# Patient Record
Sex: FEMALE | Race: WHITE | NOT HISPANIC OR LATINO | Employment: OTHER | URBAN - METROPOLITAN AREA
[De-identification: names, ages, dates, MRNs, and addresses within clinical notes are randomized per-mention and may not be internally consistent; named-entity substitution may affect disease eponyms.]

---

## 2017-02-03 ENCOUNTER — OFFICE VISIT (OUTPATIENT)
Dept: CARDIOLOGY | Facility: MEDICAL CENTER | Age: 82
End: 2017-02-03
Payer: MEDICARE

## 2017-02-03 VITALS
HEART RATE: 65 BPM | WEIGHT: 156 LBS | DIASTOLIC BLOOD PRESSURE: 72 MMHG | OXYGEN SATURATION: 94 % | SYSTOLIC BLOOD PRESSURE: 130 MMHG | BODY MASS INDEX: 23.11 KG/M2 | HEIGHT: 69 IN

## 2017-02-03 DIAGNOSIS — Z86.39 HISTORY OF HYPERCHOLESTEROLEMIA: Chronic | ICD-10-CM

## 2017-02-03 DIAGNOSIS — I34.0 MITRAL VALVE INSUFFICIENCY, UNSPECIFIED ETIOLOGY: ICD-10-CM

## 2017-02-03 DIAGNOSIS — R00.2 PALPITATIONS: Chronic | ICD-10-CM

## 2017-02-03 PROCEDURE — G8484 FLU IMMUNIZE NO ADMIN: HCPCS | Performed by: INTERNAL MEDICINE

## 2017-02-03 PROCEDURE — 1101F PT FALLS ASSESS-DOCD LE1/YR: CPT | Mod: 8P | Performed by: INTERNAL MEDICINE

## 2017-02-03 PROCEDURE — G8419 CALC BMI OUT NRM PARAM NOF/U: HCPCS | Performed by: INTERNAL MEDICINE

## 2017-02-03 PROCEDURE — 4040F PNEUMOC VAC/ADMIN/RCVD: CPT | Mod: 8P | Performed by: INTERNAL MEDICINE

## 2017-02-03 PROCEDURE — 1036F TOBACCO NON-USER: CPT | Performed by: INTERNAL MEDICINE

## 2017-02-03 PROCEDURE — 99214 OFFICE O/P EST MOD 30 MIN: CPT | Performed by: INTERNAL MEDICINE

## 2017-02-03 PROCEDURE — G8432 DEP SCR NOT DOC, RNG: HCPCS | Performed by: INTERNAL MEDICINE

## 2017-02-03 ASSESSMENT — ENCOUNTER SYMPTOMS
BLURRED VISION: 0
PND: 0
FEVER: 0
DEPRESSION: 0
CLAUDICATION: 0
LOSS OF CONSCIOUSNESS: 0
ABDOMINAL PAIN: 0
NERVOUS/ANXIOUS: 0
SPEECH CHANGE: 0
DIZZINESS: 0
ORTHOPNEA: 0
CHILLS: 0
PALPITATIONS: 0
MYALGIAS: 1
BRUISES/BLEEDS EASILY: 0
SHORTNESS OF BREATH: 0

## 2017-02-03 NOTE — Clinical Note
[facility]   [address]  [place] [address]  Phone: [phone]  Fax: [phone]              Rosemarie Guajardo  1935    Encounter Date: 2/3/2017    Viry Ewing M.D.          PROGRESS NOTE:  Subjective:   Rosemarie Guajardo is a pleasant 80-year-old female with known history of dyslipidemia, palpitations and PVD presenting today for follow-up evaluation of worsening palpitations.    For the past few weeks patient has noticed worsening intermittent episodes of palpitations more prominent in the evening lasting for a few minutes denied any specific triggering or relieving factors. No complaints of dizziness or lightheadedness associated with palpitations. No complaints of lower extremity edema or claudication. Due to myalgias patient discontinued pravastatin.    Past Medical History   Diagnosis Date   • History of hypercholesterolemia     • Palpitations     • WPW (Yoon-Parkinson-White syndrome) 1970s     Originally diagnosed in [place], [place].   • Mitral regurgitation October 2015     Echocardiogram with normal LV size, LVEF 60%. Enlarged RA. Mild MR, mild TR. RVSP 40mmHg.     Past Surgical History   Procedure Laterality Date   • Cervical disk and fusion anterior     • Cataract extraction with iol       Family History   Problem Relation Age of Onset   • Heart Disease Mother      atrial fibrillation   • Lung Disease Father      PNA   • Stroke Father      CVA     History   Smoking status   • Never Smoker    Smokeless tobacco   • Never Used     Allergies   Allergen Reactions   • Statins [Hmg-Coa-R Inhibitors]      Intolerance   • Codeine Nausea     BP drops     Outpatient Encounter Prescriptions as of 2/3/2017   Medication Sig Dispense Refill   • Multiple Vitamins-Minerals (WOMENS MULTI VITAMIN & MINERAL PO) Take  by mouth every day.     • coenzyme Q-10 100 MG Cap capsule Take 1 Cap by mouth every day. 30 Cap 11   • Cholecalciferol  "(VITAMIN D3) 2000 UNITS Tab Take  by mouth.     • Probiotic Product (PROBIOTIC DAILY PO) Take  by mouth.     • fluticasone (FLONASE) 50 MCG/ACT nasal spray Spray 2 Sprays in nose as needed.     • Fiber POWD Take  by mouth as needed.       No facility-administered encounter medications on file as of 2/3/2017.     Review of Systems   Constitutional: Negative for fever and chills.   HENT: Negative for congestion.    Eyes: Negative for blurred vision.   Respiratory: Negative for shortness of breath.    Cardiovascular: Negative for chest pain, palpitations, orthopnea, claudication, leg swelling and PND.   Gastrointestinal: Negative for abdominal pain.   Musculoskeletal: Positive for myalgias. Negative for joint pain.   Skin: Negative for rash.   Neurological: Negative for dizziness, speech change and loss of consciousness.   Endo/Heme/Allergies: Does not bruise/bleed easily.   Psychiatric/Behavioral: Negative for depression. The patient is not nervous/anxious.    All other systems reviewed and are negative.       Objective:   /72 mmHg  Pulse 65  Ht 1.765 m (5' 9.49\")  Wt 70.761 kg (156 lb)  BMI 22.71 kg/m2  SpO2 94%    Physical Exam   Constitutional: She is oriented to person, place, and time. She appears well-developed. No distress.   HENT:   Mouth/Throat: Mucous membranes are normal.   Eyes: Conjunctivae and EOM are normal.   Neck: No JVD present. No tracheal deviation present. No thyroid mass present.   Cardiovascular: Normal rate and regular rhythm.    Murmur heard.   Systolic murmur is present with a grade of 2/6   Pulses:       Radial pulses are 2+ on the right side, and 2+ on the left side.        Dorsalis pedis pulses are 2+ on the right side, and 2+ on the left side.   Systolic murmur best heard in the mitral area   Pulmonary/Chest: Effort normal and breath sounds normal. No respiratory distress. She exhibits no tenderness.   Abdominal: Soft. There is no tenderness.   Musculoskeletal: Normal range of " motion. She exhibits no edema.   Neurological: She is alert and oriented to person, place, and time. She has normal strength. She displays no tremor.   Skin: Skin is warm and dry. She is not diaphoretic.   Psychiatric: She has a normal mood and affect. Her behavior is normal.   Vitals reviewed.  Stress echo: 12/22/15  Fair exercise tolerance, achieving 10.1 METS and 99 % of max predicted heart rate.   Negative stress echo for ischemia with adequate stress achieved by   heart rate criteria.   False positive stress EKG. Frequent PAC's    Abdominal aortic Doppler: 11/30/15  No evidence of abdominal aortic aneurysm.   Velocities are elevated in the left external iliac artery at the mid level   consistent with greater than 50% stenosis    Results for ALEIDA DEL ROSARIO (MRN [id number]) as of 2015 10:10   2015    Sodium 138   Potassium 3.9   Chloride 104   Co2 28   Anion Gap 6.0   Glucose 105 (H)   Bun 14   Creatinine 0.85   GFR If Non African American >60   Calcium 9.6   AST(SGOT) 23   ALT(SGPT) 18   Alkaline Phosphatase 63   Cholesterol,Tot 243 (H)   Triglycerides 162 (H)   HDL 56    (H)     TTE: 10/13/15  No prior study is available for comparison.   Left ventricular ejection fraction is visually estimated to be 60%. Normal regional wall motion. Grade I diastolic dysfunction.  Mild mitral regurgitation.  Mild tricuspid regurgitation. Right ventricular systolic pressure is estimated to be 40 mmHg    EK/29/15  Sinus bradycardia 57 bpm normal axis T wave inversions V3 to V6  No evidence of WPW on EKG  Assessment:     1. Dyslipidemia  2. Mitral regurgitation  3. Palpitations  4. PVD    Medical Decision Making:  Today's Assessment / Status / Plan:     1. Statins were discontinued due to myalgias.  Continue dietary modification and exercise.  2. Repeat echo in 5 years.  3. Symptomatic PVCs versus SVT   48 Holter monitor for further evaluation.  4. Complaints of claudication will continue to monitor for  now    Follow-up in 4 weeks.  Holter monitor prior to next visit.    Thank you for allowing me to participate in taking care of patient.    Viry Ewing. MD.        Jayden Newman M.D.  [address]  [place] [address]  VIA Facsimile: [phone]

## 2017-02-03 NOTE — PROGRESS NOTES
Subjective:   Rosemarie Guajardo is a pleasant 80-year-old female with known history of dyslipidemia, palpitations and PVD presenting today for follow-up evaluation of worsening palpitations.    For the past few weeks patient has noticed worsening intermittent episodes of palpitations more prominent in the evening lasting for a few minutes denied any specific triggering or relieving factors. No complaints of dizziness or lightheadedness associated with palpitations. No complaints of lower extremity edema or claudication. Due to myalgias patient discontinued pravastatin.    Past Medical History   Diagnosis Date   • History of hypercholesterolemia     • Palpitations     • WPW (Yoon-Parkinson-White syndrome) 1970s     Originally diagnosed in [place], [place].   • Mitral regurgitation October 2015     Echocardiogram with normal LV size, LVEF 60%. Enlarged RA. Mild MR, mild TR. RVSP 40mmHg.     Past Surgical History   Procedure Laterality Date   • Cervical disk and fusion anterior     • Cataract extraction with iol       Family History   Problem Relation Age of Onset   • Heart Disease Mother      atrial fibrillation   • Lung Disease Father      PNA   • Stroke Father      CVA     History   Smoking status   • Never Smoker    Smokeless tobacco   • Never Used     Allergies   Allergen Reactions   • Statins [Hmg-Coa-R Inhibitors]      Intolerance   • Codeine Nausea     BP drops     Outpatient Encounter Prescriptions as of 2/3/2017   Medication Sig Dispense Refill   • Multiple Vitamins-Minerals (WOMENS MULTI VITAMIN & MINERAL PO) Take  by mouth every day.     • coenzyme Q-10 100 MG Cap capsule Take 1 Cap by mouth every day. 30 Cap 11   • Cholecalciferol (VITAMIN D3) 2000 UNITS Tab Take  by mouth.     • Probiotic Product (PROBIOTIC DAILY PO) Take  by mouth.     • fluticasone (FLONASE) 50 MCG/ACT nasal spray Spray 2 Sprays in nose as needed.     • Fiber POWD Take  by mouth as needed.       No facility-administered encounter  "medications on file as of 2/3/2017.     Review of Systems   Constitutional: Negative for fever and chills.   HENT: Negative for congestion.    Eyes: Negative for blurred vision.   Respiratory: Negative for shortness of breath.    Cardiovascular: Negative for chest pain, palpitations, orthopnea, claudication, leg swelling and PND.   Gastrointestinal: Negative for abdominal pain.   Musculoskeletal: Positive for myalgias. Negative for joint pain.   Skin: Negative for rash.   Neurological: Negative for dizziness, speech change and loss of consciousness.   Endo/Heme/Allergies: Does not bruise/bleed easily.   Psychiatric/Behavioral: Negative for depression. The patient is not nervous/anxious.    All other systems reviewed and are negative.       Objective:   /72 mmHg  Pulse 65  Ht 1.765 m (5' 9.49\")  Wt 70.761 kg (156 lb)  BMI 22.71 kg/m2  SpO2 94%    Physical Exam   Constitutional: She is oriented to person, place, and time. She appears well-developed. No distress.   HENT:   Mouth/Throat: Mucous membranes are normal.   Eyes: Conjunctivae and EOM are normal.   Neck: No JVD present. No tracheal deviation present. No thyroid mass present.   Cardiovascular: Normal rate and regular rhythm.    Murmur heard.   Systolic murmur is present with a grade of 2/6   Pulses:       Radial pulses are 2+ on the right side, and 2+ on the left side.        Dorsalis pedis pulses are 2+ on the right side, and 2+ on the left side.   Systolic murmur best heard in the mitral area   Pulmonary/Chest: Effort normal and breath sounds normal. No respiratory distress. She exhibits no tenderness.   Abdominal: Soft. There is no tenderness.   Musculoskeletal: Normal range of motion. She exhibits no edema.   Neurological: She is alert and oriented to person, place, and time. She has normal strength. She displays no tremor.   Skin: Skin is warm and dry. She is not diaphoretic.   Psychiatric: She has a normal mood and affect. Her behavior is " normal.   Vitals reviewed.  Stress echo: 12/22/15  Fair exercise tolerance, achieving 10.1 METS and 99 % of max predicted heart rate.   Negative stress echo for ischemia with adequate stress achieved by   heart rate criteria.   False positive stress EKG. Frequent PAC's    Abdominal aortic Doppler: 11/30/15  No evidence of abdominal aortic aneurysm.   Velocities are elevated in the left external iliac artery at the mid level   consistent with greater than 50% stenosis    Results for ALEIDA DEL ROSARIO (MRN [id number]) as of 2015 10:10   2015    Sodium 138   Potassium 3.9   Chloride 104   Co2 28   Anion Gap 6.0   Glucose 105 (H)   Bun 14   Creatinine 0.85   GFR If Non African American >60   Calcium 9.6   AST(SGOT) 23   ALT(SGPT) 18   Alkaline Phosphatase 63   Cholesterol,Tot 243 (H)   Triglycerides 162 (H)   HDL 56    (H)     TTE: 10/13/15  No prior study is available for comparison.   Left ventricular ejection fraction is visually estimated to be 60%. Normal regional wall motion. Grade I diastolic dysfunction.  Mild mitral regurgitation.  Mild tricuspid regurgitation. Right ventricular systolic pressure is estimated to be 40 mmHg    EK/29/15  Sinus bradycardia 57 bpm normal axis T wave inversions V3 to V6  No evidence of WPW on EKG  Assessment:     1. Dyslipidemia  2. Mitral regurgitation  3. Palpitations  4. PVD    Medical Decision Making:  Today's Assessment / Status / Plan:     1. Statins were discontinued due to myalgias.  Continue dietary modification and exercise.  2. Repeat echo in 5 years.  3. Symptomatic PVCs versus SVT   48 Holter monitor for further evaluation.  4. Complaints of claudication will continue to monitor for now    Follow-up in 4 weeks.  Holter monitor prior to next visit.    Thank you for allowing me to participate in taking care of patient.    Viry Tamayo MD.

## 2017-02-03 NOTE — MR AVS SNAPSHOT
"        Rosemarie Guajardo   2/3/2017 11:30 AM   Office Visit   MRN: [id number]    Department:  Heart [facility]   Dept Phone:  [phone]    Description:  Female : 1935   Provider:  Viry Ewing M.D.           Reason for Visit     Follow-Up           Allergies as of 2/3/2017     Allergen Noted Reactions    Statins [Hmg-Coa-R Inhibitors] 2009       Intolerance    Codeine 2015   Nausea    BP drops      You were diagnosed with     Palpitations   [785.1.ICD-9-CM]       Mitral valve insufficiency, unspecified etiology   [0184323]       History of hypercholesterolemia   [517366]         Vital Signs     Blood Pressure Pulse Height Weight Body Mass Index Oxygen Saturation    130/72 mmHg 65 1.765 m (5' 9.49\") 70.761 kg (156 lb) 22.71 kg/m2 94%    Smoking Status                   Never Smoker            Basic Information     Date Of Birth Sex Race Ethnicity Preferred Language    1935 Female White Non- English      Your appointments     Mar 07, 2017 11:15 AM   FOLLOW UP with Viry Ewing M.D.   [facility]    [address]  [facility] [address] [phone]              Problem List              ICD-10-CM Priority Class Noted - Resolved    History of hypercholesterolemia (Chronic) Z86.39 High  2012 - Present    Palpitations (Chronic) R00.2 High  2012 - Present    WPW (Yoon-Parkinson-White syndrome) (Chronic) I45.6 Medium  2012 - Present    Mitral regurgitation I34.0 Medium  1/15/2016 - Present      Health Maintenance        Date Due Completion Dates    IMM DTaP/Tdap/Td Vaccine (1 - Tdap) 1954 ---    PAP SMEAR 1956 ---    MAMMOGRAM 1975 ---    COLONOSCOPY 1985 ---    IMM ZOSTER VACCINE 1995 ---    BONE DENSITY 2000 ---    IMM PNEUMOCOCCAL 65+ (ADULT) LOW/MEDIUM RISK SERIES (1 of 2 - PCV13) 2000 ---    IMM INFLUENZA (1) 2016 ---            Current Immunizations     No " immunizations on file.      Below and/or attached are the medications your provider expects you to take. Review all of your home medications and newly ordered medications with your provider and/or pharmacist. Follow medication instructions as directed by your provider and/or pharmacist. Please keep your medication list with you and share with your provider. Update the information when medications are discontinued, doses are changed, or new medications (including over-the-counter products) are added; and carry medication information at all times in the event of emergency situations     Allergies:  STATINS - (reactions not documented)     CODEINE - Nausea               Medications  Valid as of: February 03, 2017 - 11:38 AM    Generic Name Brand Name Tablet Size Instructions for use    Cholecalciferol (Tab) Vitamin D3 2000 UNITS Take  by mouth.        Coenzyme Q10 (Cap) coenzyme Q-10 100 MG Take 1 Cap by mouth every day.        Fiber (Powder) Fiber  Take  by mouth as needed.        Fluticasone Propionate (Suspension) FLONASE 50 MCG/ACT Spray 2 Sprays in nose as needed.        Multiple Vitamins-Minerals   Take  by mouth every day.        Probiotic Product   Take  by mouth.        .                 Medicines prescribed today were sent to:     [id number] - [place], [place] - [address]    [address] [facility] [address]    Phone: [phone] Fax: [phone]    Open 24 Hours?: No      Medication refill instructions:       If your prescription bottle indicates you have medication refills left, it is not necessary to call your provider’s office. Please contact your pharmacy and they will refill your medication.    If your prescription bottle indicates you do not have any refills left, you may request refills at any time through one of the following ways: The online [employer] system (except Urgent Care), by calling your provider’s office, or by asking your pharmacy to contact your provider’s office with a refill request.  Medication refills are processed only during regular business hours and may not be available until the next business day. Your provider may request additional information or to have a follow-up visit with you prior to refilling your medication.   *Please Note: Medication refills are assigned a new Rx number when refilled electronically. Your pharmacy may indicate that no refills were authorized even though a new prescription for the same medication is available at the pharmacy. Please request the medicine by name with the pharmacy before contacting your provider for a refill.        Your To Do List     Future Labs/Procedures Complete By Expires    HOLTER MONITOR STUDY  As directed 2/3/2018         [employer] Access Code: Activation code not generated  Current [employer] Status: Active

## 2017-02-21 ENCOUNTER — NON-PROVIDER VISIT (OUTPATIENT)
Dept: CARDIOLOGY | Facility: MEDICAL CENTER | Age: 82
End: 2017-02-21
Payer: MEDICARE

## 2017-02-21 DIAGNOSIS — I49.3 PVC (PREMATURE VENTRICULAR CONTRACTION): ICD-10-CM

## 2017-02-21 DIAGNOSIS — R00.2 PALPITATIONS: Chronic | ICD-10-CM

## 2017-02-21 DIAGNOSIS — I49.1 PREMATURE ATRIAL CONTRACTION: ICD-10-CM

## 2017-02-24 DIAGNOSIS — R00.2 PALPITATIONS: Chronic | ICD-10-CM

## 2017-02-27 ENCOUNTER — TELEPHONE (OUTPATIENT)
Dept: CARDIOLOGY | Facility: MEDICAL CENTER | Age: 82
End: 2017-02-27

## 2017-02-27 NOTE — TELEPHONE ENCOUNTER
----- Message from Jenny Avitia sent at 2/27/2017  9:05 AM PST -----  Regarding: Patient calling about monitor results  AM/[place]    Patient wants a call back to get the results of her heart monitor and can be reached at [phone].

## 2017-02-27 NOTE — TELEPHONE ENCOUNTER
Pt. notified of preliminary report (not read by Dr. Ewing yet) Shows some PVC's & PAC's. No AF or pauses. Will review at appt 3/7.

## 2017-03-01 LAB — EKG IMPRESSION: NORMAL

## 2017-03-01 PROCEDURE — 93224 XTRNL ECG REC UP TO 48 HRS: CPT | Performed by: INTERNAL MEDICINE

## 2017-03-07 ENCOUNTER — OFFICE VISIT (OUTPATIENT)
Dept: CARDIOLOGY | Facility: MEDICAL CENTER | Age: 82
End: 2017-03-07
Payer: MEDICARE

## 2017-03-07 VITALS
BODY MASS INDEX: 23.4 KG/M2 | OXYGEN SATURATION: 95 % | HEART RATE: 56 BPM | SYSTOLIC BLOOD PRESSURE: 116 MMHG | HEIGHT: 69 IN | DIASTOLIC BLOOD PRESSURE: 66 MMHG | WEIGHT: 158 LBS

## 2017-03-07 DIAGNOSIS — R00.2 PALPITATIONS: Chronic | ICD-10-CM

## 2017-03-07 DIAGNOSIS — Z86.39 HISTORY OF HYPERCHOLESTEROLEMIA: Chronic | ICD-10-CM

## 2017-03-07 PROCEDURE — 4040F PNEUMOC VAC/ADMIN/RCVD: CPT | Mod: 8P | Performed by: INTERNAL MEDICINE

## 2017-03-07 PROCEDURE — 1101F PT FALLS ASSESS-DOCD LE1/YR: CPT | Mod: 8P | Performed by: INTERNAL MEDICINE

## 2017-03-07 PROCEDURE — G8432 DEP SCR NOT DOC, RNG: HCPCS | Performed by: INTERNAL MEDICINE

## 2017-03-07 PROCEDURE — G8484 FLU IMMUNIZE NO ADMIN: HCPCS | Performed by: INTERNAL MEDICINE

## 2017-03-07 PROCEDURE — G8420 CALC BMI NORM PARAMETERS: HCPCS | Performed by: INTERNAL MEDICINE

## 2017-03-07 PROCEDURE — 99214 OFFICE O/P EST MOD 30 MIN: CPT | Performed by: INTERNAL MEDICINE

## 2017-03-07 PROCEDURE — 1036F TOBACCO NON-USER: CPT | Performed by: INTERNAL MEDICINE

## 2017-03-07 ASSESSMENT — ENCOUNTER SYMPTOMS
DIZZINESS: 0
DEPRESSION: 0
ABDOMINAL PAIN: 0
SHORTNESS OF BREATH: 0
BRUISES/BLEEDS EASILY: 0
FEVER: 0
SPEECH CHANGE: 0
LOSS OF CONSCIOUSNESS: 0
MYALGIAS: 1
CLAUDICATION: 0
NERVOUS/ANXIOUS: 0
PALPITATIONS: 1
PND: 0
ORTHOPNEA: 0
CHILLS: 0
BLURRED VISION: 0

## 2017-03-07 NOTE — MR AVS SNAPSHOT
"Rosemarie Guajardo   3/7/2017 11:15 AM   Office Visit   MRN: [id number]    Department:  Heart [facility]   Dept Phone:  [phone]    Description:  Female : 1935   Provider:  Viry Ewing M.D.           Reason for Visit     Follow-Up           Allergies as of 3/7/2017     Allergen Noted Reactions    Statins [Hmg-Coa-R Inhibitors] 2009       Intolerance    Codeine 2015   Nausea    BP drops      You were diagnosed with     Palpitations   [785.1.ICD-9-CM]       History of hypercholesterolemia   [462510]         Vital Signs     Blood Pressure Pulse Height Weight Body Mass Index Oxygen Saturation    116/66 mmHg 56 1.753 m (5' 9\") 71.668 kg (158 lb) 23.32 kg/m2 95%    Smoking Status                   Never Smoker            Basic Information     Date Of Birth Sex Race Ethnicity Preferred Language    1935 Female White Non- English      Problem List              ICD-10-CM Priority Class Noted - Resolved    History of hypercholesterolemia (Chronic) Z86.39 High  2012 - Present    Palpitations (Chronic) R00.2 High  2012 - Present    WPW (Yoon-Parkinson-White syndrome) (Chronic) I45.6 Medium  2012 - Present    Mitral regurgitation I34.0 Medium  1/15/2016 - Present      Health Maintenance        Date Due Completion Dates    IMM DTaP/Tdap/Td Vaccine (1 - Tdap) 1954 ---    PAP SMEAR 1956 ---    MAMMOGRAM 1975 ---    COLONOSCOPY 1985 ---    IMM ZOSTER VACCINE 1995 ---    BONE DENSITY 2000 ---    IMM PNEUMOCOCCAL 65+ (ADULT) LOW/MEDIUM RISK SERIES (1 of 2 - PCV13) 2000 ---    IMM INFLUENZA (1) 2016 ---            Current Immunizations     No immunizations on file.      Below and/or attached are the medications your provider expects you to take. Review all of your home medications and newly ordered medications with your provider and/or pharmacist. Follow medication instructions as directed by your provider and/or pharmacist. Please keep your " medication list with you and share with your provider. Update the information when medications are discontinued, doses are changed, or new medications (including over-the-counter products) are added; and carry medication information at all times in the event of emergency situations     Allergies:  STATINS - (reactions not documented)     CODEINE - Nausea               Medications  Valid as of: March 07, 2017 - 11:17 AM    Generic Name Brand Name Tablet Size Instructions for use    Cholecalciferol (Tab) Vitamin D3 2000 UNITS Take  by mouth.        Coenzyme Q10 (Cap) coenzyme Q-10 100 MG Take 1 Cap by mouth every day.        Fiber (Powder) Fiber  Take  by mouth as needed.        Fluticasone Propionate (Suspension) FLONASE 50 MCG/ACT Spray 2 Sprays in nose as needed.        Lutein   Take  by mouth.        Multiple Vitamins-Minerals   Take  by mouth every day.        Pravastatin Sodium   Take  by mouth.        Probiotic Product   Take  by mouth.        .                 Medicines prescribed today were sent to:     [id number] - [place], [place] - [address]    [address] [place] [address]    Phone: [phone] Fax: [phone]    Open 24 Hours?: No      Medication refill instructions:       If your prescription bottle indicates you have medication refills left, it is not necessary to call your provider’s office. Please contact your pharmacy and they will refill your medication.    If your prescription bottle indicates you do not have any refills left, you may request refills at any time through one of the following ways: The online "[employer] system (except Urgent Care), by calling your provider’s office, or by asking your pharmacy to contact your provider’s office with a refill request. Medication refills are processed only during regular business hours and may not be available until the next business day. Your provider may request additional information or to have a follow-up visit with you prior to refilling your  medication.   *Please Note: Medication refills are assigned a new Rx number when refilled electronically. Your pharmacy may indicate that no refills were authorized even though a new prescription for the same medication is available at the pharmacy. Please request the medicine by name with the pharmacy before contacting your provider for a refill.        Your To Do List     Future Labs/Procedures Complete By Expires    COMP METABOLIC PANEL  As directed 3/7/2018    COMP METABOLIC PANEL  As directed 3/7/2018    LIPID PROFILE  As directed 3/7/2018    LIPID PROFILE  As directed 3/7/2018         MyChart Access Code: Activation code not generated  Current [employer] Status: Active

## 2017-03-07 NOTE — Clinical Note
[facility]   [address]  [place] [address]  Phone: [phone]  Fax: [phone]              Rosemarie Guajardo  1935    Encounter Date: 3/7/2017    Viry Ewing M.D.          PROGRESS NOTE:  Subjective:   Rosemarie Guajardo is a pleasant 80-year-old female with known history of dyslipidemia, palpitations and PVD presenting today for follow-up evaluation of palpitations.    Patient recently has increased caffeine intake since then patient has noticed worsening episodes of palpitations. Since she cut down caffeine intake palpitations have gone down. Denied any complaints of exertional chest pain, pressure or tightness. No complaints of dizziness, lightheadedness or LOC. No complaints of lower extremity edema or claudication.    Past Medical History   Diagnosis Date   • History of hypercholesterolemia     • Palpitations     • WPW (Yoon-Parkinson-White syndrome) 1970s     Originally diagnosed in [place], [place].   • Mitral regurgitation October 2015     Echocardiogram with normal LV size, LVEF 60%. Enlarged RA. Mild MR, mild TR. RVSP 40mmHg.     Past Surgical History   Procedure Laterality Date   • Cervical disk and fusion anterior     • Cataract extraction with iol       Family History   Problem Relation Age of Onset   • Heart Disease Mother      atrial fibrillation   • Lung Disease Father      PNA   • Stroke Father      CVA     History   Smoking status   • Never Smoker    Smokeless tobacco   • Never Used     Allergies   Allergen Reactions   • Statins [Hmg-Coa-R Inhibitors]      Intolerance   • Codeine Nausea     BP drops     Outpatient Encounter Prescriptions as of 3/7/2017   Medication Sig Dispense Refill   • PRAVASTATIN SODIUM PO Take  by mouth.     • LUTEIN PO Take  by mouth.     • fluticasone (FLONASE) 50 MCG/ACT nasal spray Spray 2 Sprays in nose as needed.     • Multiple Vitamins-Minerals (WOMENS MULTI VITAMIN & MINERAL PO)  "Take  by mouth every day.     • coenzyme Q-10 100 MG Cap capsule Take 1 Cap by mouth every day. 30 Cap 11   • Cholecalciferol (VITAMIN D3) 2000 UNITS Tab Take  by mouth.     • Probiotic Product (PROBIOTIC DAILY PO) Take  by mouth.     • Fiber POWD Take  by mouth as needed.       No facility-administered encounter medications on file as of 3/7/2017.     Review of Systems   Constitutional: Negative for fever and chills.   HENT: Negative for congestion.    Eyes: Negative for blurred vision.   Respiratory: Negative for shortness of breath.    Cardiovascular: Positive for palpitations. Negative for chest pain, orthopnea, claudication, leg swelling and PND.   Gastrointestinal: Negative for abdominal pain.   Musculoskeletal: Positive for myalgias. Negative for joint pain.   Skin: Negative for rash.   Neurological: Negative for dizziness, speech change and loss of consciousness.   Endo/Heme/Allergies: Does not bruise/bleed easily.   Psychiatric/Behavioral: Negative for depression. The patient is not nervous/anxious.    All other systems reviewed and are negative.       Objective:   /66 mmHg  Pulse 56  Ht 1.753 m (5' 9\")  Wt 71.668 kg (158 lb)  BMI 23.32 kg/m2  SpO2 95%    Physical Exam   Constitutional: She is oriented to person, place, and time. She appears well-developed. No distress.   HENT:   Mouth/Throat: Mucous membranes are normal.   Eyes: Conjunctivae and EOM are normal.   Neck: No JVD present. No tracheal deviation present. No thyroid mass present.   Cardiovascular: Normal rate and regular rhythm.    Murmur heard.   Systolic murmur is present with a grade of 2/6   Pulses:       Radial pulses are 2+ on the right side, and 2+ on the left side.        Dorsalis pedis pulses are 2+ on the right side, and 2+ on the left side.   Systolic murmur best heard in the mitral area   Pulmonary/Chest: Effort normal and breath sounds normal. No respiratory distress. She exhibits no tenderness.   Abdominal: Soft. There is " no tenderness.   Musculoskeletal: Normal range of motion. She exhibits no edema.   Neurological: She is alert and oriented to person, place, and time. She has normal strength. She displays no tremor.   Skin: Skin is warm and dry. She is not diaphoretic.   Psychiatric: She has a normal mood and affect. Her behavior is normal.   Vitals reviewed.   48 hour Holter monitor: 2/21/17  Tracings were personally reviewed by me along with patient.  Underlying rhythm was sinus with minimum heart rate of 48 bpm and have a target of 58 BPM.  Most of the times when patient was symptomatic underlying rhythm was sinus one episode patient rhythm was sinus with PVC.  2 atrial run seen. Longest run was 6 beats at a heart rate of 125 BPM asymptomatic.  Results for ALEIDA DEL ROSARIO (MRN [id number]) as of 3/7/2017 12:26   4/19/2016 7/22/2016    Sodium 136 143   Potassium 4.1 4.3   Chloride 96 (L) 103   Co2 22 24   Glucose 92 100 (H)   Bun 15 14   Creatinine 0.81 0.76   GFR If Non  69 74   Bun-Creatinine Ratio 19 18   Calcium 9.5 9.2   AST(SGOT) 22 36   ALT(SGPT) 18 19   Alkaline Phosphatase 62 62   Cholesterol,Tot 215 (H) 168   Triglycerides 186 (H) 103   HDL 59 61    (H) 86   VLDL Cholesterol Calc 37 21     Stress echo: 12/22/15  Fair exercise tolerance, achieving 10.1 METS and 99 % of max predicted heart rate.   Negative stress echo for ischemia with adequate stress achieved by   heart rate criteria.   False positive stress EKG. Frequent PAC's    Abdominal aortic Doppler: 11/30/15  No evidence of abdominal aortic aneurysm.   Velocities are elevated in the left external iliac artery at the mid level   consistent with greater than 50% stenosis    TTE: 10/13/15  No prior study is available for comparison.   Left ventricular ejection fraction is visually estimated to be 60%. Normal regional wall motion. Grade I diastolic dysfunction.  Mild mitral regurgitation.  Mild tricuspid regurgitation. Right ventricular  systolic pressure is estimated to be 40 mmHg    EK/29/15  Sinus bradycardia 57 bpm normal axis T wave inversions V3 to V6  No evidence of WPW on EKG  Assessment:     1. Dyslipidemia  2. Mitral regurgitation  3. Palpitations  4. PVD  Medical Decision Making:  Today's Assessment / Status / Plan:     1. Patient denied any myalgias while on pravastatin.  LDL less than 100.  Continue pravastatin.  2. Repeat echo in 4 years.  3. Symptomatic PVCs reassurance for now.  Will hold off on initiating beta blocker secondary to bradycardia. If patient still continues to have symptoms will consider initiating digoxin.  4. Asymptomatic will continue to monitor  Patient is on statins.    Follow-up in 1 year.  Labs in July and then in one year prior to next visit.    Thank you for allowing me to participate in taking care of patient.    Viry Tamayo MD.        Jayden Newman M.D.  [address]  [address]  [facility] [address]  VIA Facsimile: [phone]

## 2017-03-07 NOTE — PROGRESS NOTES
Subjective:   Rosemarie Guajardo is a pleasant 80-year-old female with known history of dyslipidemia, palpitations and PVD presenting today for follow-up evaluation of palpitations.    Patient recently has increased caffeine intake since then patient has noticed worsening episodes of palpitations. Since she cut down caffeine intake palpitations have gone down. Denied any complaints of exertional chest pain, pressure or tightness. No complaints of dizziness, lightheadedness or LOC. No complaints of lower extremity edema or claudication.    Past Medical History   Diagnosis Date   • History of hypercholesterolemia     • Palpitations     • WPW (Yoon-Parkinson-White syndrome) 1970s     Originally diagnosed in [place], [place].   • Mitral regurgitation October 2015     Echocardiogram with normal LV size, LVEF 60%. Enlarged RA. Mild MR, mild TR. RVSP 40mmHg.     Past Surgical History   Procedure Laterality Date   • Cervical disk and fusion anterior     • Cataract extraction with iol       Family History   Problem Relation Age of Onset   • Heart Disease Mother      atrial fibrillation   • Lung Disease Father      PNA   • Stroke Father      CVA     History   Smoking status   • Never Smoker    Smokeless tobacco   • Never Used     Allergies   Allergen Reactions   • Statins [Hmg-Coa-R Inhibitors]      Intolerance   • Codeine Nausea     BP drops     Outpatient Encounter Prescriptions as of 3/7/2017   Medication Sig Dispense Refill   • PRAVASTATIN SODIUM PO Take  by mouth.     • LUTEIN PO Take  by mouth.     • fluticasone (FLONASE) 50 MCG/ACT nasal spray Spray 2 Sprays in nose as needed.     • Multiple Vitamins-Minerals (WOMENS MULTI VITAMIN & MINERAL PO) Take  by mouth every day.     • coenzyme Q-10 100 MG Cap capsule Take 1 Cap by mouth every day. 30 Cap 11   • Cholecalciferol (VITAMIN D3) 2000 UNITS Tab Take  by mouth.     • Probiotic Product (PROBIOTIC DAILY PO) Take  by mouth.     • Fiber POWD Take  by mouth as needed.       No  "facility-administered encounter medications on file as of 3/7/2017.     Review of Systems   Constitutional: Negative for fever and chills.   HENT: Negative for congestion.    Eyes: Negative for blurred vision.   Respiratory: Negative for shortness of breath.    Cardiovascular: Positive for palpitations. Negative for chest pain, orthopnea, claudication, leg swelling and PND.   Gastrointestinal: Negative for abdominal pain.   Musculoskeletal: Positive for myalgias. Negative for joint pain.   Skin: Negative for rash.   Neurological: Negative for dizziness, speech change and loss of consciousness.   Endo/Heme/Allergies: Does not bruise/bleed easily.   Psychiatric/Behavioral: Negative for depression. The patient is not nervous/anxious.    All other systems reviewed and are negative.       Objective:   /66 mmHg  Pulse 56  Ht 1.753 m (5' 9\")  Wt 71.668 kg (158 lb)  BMI 23.32 kg/m2  SpO2 95%    Physical Exam   Constitutional: She is oriented to person, place, and time. She appears well-developed. No distress.   HENT:   Mouth/Throat: Mucous membranes are normal.   Eyes: Conjunctivae and EOM are normal.   Neck: No JVD present. No tracheal deviation present. No thyroid mass present.   Cardiovascular: Normal rate and regular rhythm.    Murmur heard.   Systolic murmur is present with a grade of 2/6   Pulses:       Radial pulses are 2+ on the right side, and 2+ on the left side.        Dorsalis pedis pulses are 2+ on the right side, and 2+ on the left side.   Systolic murmur best heard in the mitral area   Pulmonary/Chest: Effort normal and breath sounds normal. No respiratory distress. She exhibits no tenderness.   Abdominal: Soft. There is no tenderness.   Musculoskeletal: Normal range of motion. She exhibits no edema.   Neurological: She is alert and oriented to person, place, and time. She has normal strength. She displays no tremor.   Skin: Skin is warm and dry. She is not diaphoretic.   Psychiatric: She has a " normal mood and affect. Her behavior is normal.   Vitals reviewed.   48 hour Holter monitor: 17  Tracings were personally reviewed by me along with patient.  Underlying rhythm was sinus with minimum heart rate of 48 bpm and have a target of 58 BPM.  Most of the times when patient was symptomatic underlying rhythm was sinus one episode patient rhythm was sinus with PVC.  2 atrial run seen. Longest run was 6 beats at a heart rate of 125 BPM asymptomatic.  Results for ALEIDA DEL ROSARIO (MRN [id number]) as of 3/7/2017 12:26   2016    Sodium 136 143   Potassium 4.1 4.3   Chloride 96 (L) 103   Co2 22 24   Glucose 92 100 (H)   Bun 15 14   Creatinine 0.81 0.76   GFR If Non  69 74   Bun-Creatinine Ratio 19 18   Calcium 9.5 9.2   AST(SGOT) 22 36   ALT(SGPT) 18 19   Alkaline Phosphatase 62 62   Cholesterol,Tot 215 (H) 168   Triglycerides 186 (H) 103   HDL 59 61    (H) 86   VLDL Cholesterol Calc 37 21     Stress echo: 12/22/15  Fair exercise tolerance, achieving 10.1 METS and 99 % of max predicted heart rate.   Negative stress echo for ischemia with adequate stress achieved by   heart rate criteria.   False positive stress EKG. Frequent PAC's    Abdominal aortic Doppler: 11/30/15  No evidence of abdominal aortic aneurysm.   Velocities are elevated in the left external iliac artery at the mid level   consistent with greater than 50% stenosis    TTE: 10/13/15  No prior study is available for comparison.   Left ventricular ejection fraction is visually estimated to be 60%. Normal regional wall motion. Grade I diastolic dysfunction.  Mild mitral regurgitation.  Mild tricuspid regurgitation. Right ventricular systolic pressure is estimated to be 40 mmHg    EK/29/15  Sinus bradycardia 57 bpm normal axis T wave inversions V3 to V6  No evidence of WPW on EKG  Assessment:     1. Dyslipidemia  2. Mitral regurgitation  3. Palpitations  4. PVD  Medical Decision Making:  Today's Assessment /  Status / Plan:     1. Patient denied any myalgias while on pravastatin.  LDL less than 100.  Continue pravastatin.  2. Repeat echo in 4 years.  3. Symptomatic PVCs reassurance for now.  Will hold off on initiating beta blocker secondary to bradycardia. If patient still continues to have symptoms will consider initiating digoxin.  4. Asymptomatic will continue to monitor  Patient is on statins.    Follow-up in 1 year.  Labs in July and then in one year prior to next visit.    Thank you for allowing me to participate in taking care of patient.    Viry Tamayo MD.

## 2017-04-25 ENCOUNTER — RX ONLY (OUTPATIENT)
Age: 82
Setting detail: RX ONLY
End: 2017-04-25

## 2017-09-27 LAB
ALBUMIN SERPL-MCNC: 4.6 G/DL (ref 3.5–4.7)
ALBUMIN/GLOB SERPL: 1.6 {RATIO} (ref 1.2–2.2)
ALP SERPL-CCNC: 62 IU/L (ref 39–117)
ALT SERPL-CCNC: 17 IU/L (ref 0–32)
AST SERPL-CCNC: 22 IU/L (ref 0–40)
BILIRUB SERPL-MCNC: 0.6 MG/DL (ref 0–1.2)
BUN SERPL-MCNC: 10 MG/DL (ref 8–27)
BUN/CREAT SERPL: 13 (ref 12–28)
CALCIUM SERPL-MCNC: 9.4 MG/DL (ref 8.7–10.3)
CHLORIDE SERPL-SCNC: 99 MMOL/L (ref 96–106)
CHOLEST SERPL-MCNC: 231 MG/DL (ref 100–199)
CO2 SERPL-SCNC: 23 MMOL/L (ref 18–29)
COMMENT 011824: ABNORMAL
CREAT SERPL-MCNC: 0.8 MG/DL (ref 0.57–1)
GLOBULIN SER CALC-MCNC: 2.8 G/DL (ref 1.5–4.5)
GLUCOSE SERPL-MCNC: 104 MG/DL (ref 65–99)
HDLC SERPL-MCNC: 67 MG/DL
LDLC SERPL CALC-MCNC: 131 MG/DL (ref 0–99)
POTASSIUM SERPL-SCNC: 4 MMOL/L (ref 3.5–5.2)
PROT SERPL-MCNC: 7.4 G/DL (ref 6–8.5)
SODIUM SERPL-SCNC: 141 MMOL/L (ref 134–144)
TRIGL SERPL-MCNC: 165 MG/DL (ref 0–149)
VLDLC SERPL CALC-MCNC: 33 MG/DL (ref 5–40)

## 2017-10-31 ENCOUNTER — APPOINTMENT (RX ONLY)
Dept: URBAN - METROPOLITAN AREA CLINIC 4 | Facility: CLINIC | Age: 82
Setting detail: DERMATOLOGY
End: 2017-10-31

## 2017-10-31 DIAGNOSIS — L82.1 OTHER SEBORRHEIC KERATOSIS: ICD-10-CM

## 2017-10-31 DIAGNOSIS — L81.4 OTHER MELANIN HYPERPIGMENTATION: ICD-10-CM

## 2017-10-31 DIAGNOSIS — L85.3 XEROSIS CUTIS: ICD-10-CM

## 2017-10-31 DIAGNOSIS — D18.0 HEMANGIOMA: ICD-10-CM

## 2017-10-31 DIAGNOSIS — M71 OTHER BURSOPATHIES: ICD-10-CM

## 2017-10-31 PROBLEM — M71.341 OTHER BURSAL CYST, RIGHT HAND: Status: ACTIVE | Noted: 2017-10-31

## 2017-10-31 PROBLEM — Z85.828 PERSONAL HISTORY OF OTHER MALIGNANT NEOPLASM OF SKIN: Status: ACTIVE | Noted: 2017-10-31

## 2017-10-31 PROBLEM — D18.01 HEMANGIOMA OF SKIN AND SUBCUTANEOUS TISSUE: Status: ACTIVE | Noted: 2017-10-31

## 2017-10-31 PROBLEM — M71.342 OTHER BURSAL CYST, LEFT HAND: Status: ACTIVE | Noted: 2017-10-31

## 2017-10-31 PROCEDURE — ? MEDICATION COUNSELING

## 2017-10-31 PROCEDURE — 99213 OFFICE O/P EST LOW 20 MIN: CPT

## 2017-10-31 PROCEDURE — ? COUNSELING

## 2017-10-31 PROCEDURE — ? PRESCRIPTION

## 2017-10-31 RX ORDER — TRIAMCINOLONE ACETONIDE 1 MG/G
CREAM TOPICAL BID
Qty: 1 | Refills: 6 | Status: ERX | COMMUNITY
Start: 2017-10-31

## 2017-10-31 RX ADMIN — TRIAMCINOLONE ACETONIDE: 1 CREAM TOPICAL at 00:00

## 2017-10-31 ASSESSMENT — LOCATION DETAILED DESCRIPTION DERM
LOCATION DETAILED: LEFT DISTAL DORSAL FOREARM
LOCATION DETAILED: LEFT RADIAL DORSAL HAND
LOCATION DETAILED: LEFT INFERIOR CENTRAL MALAR CHEEK
LOCATION DETAILED: LEFT INFERIOR MEDIAL MIDBACK
LOCATION DETAILED: RIGHT RADIAL DORSAL HAND
LOCATION DETAILED: UPPER STERNUM
LOCATION DETAILED: RIGHT MID-UPPER BACK
LOCATION DETAILED: RIGHT DISTAL DORSAL FOREARM
LOCATION DETAILED: LEFT PROXIMAL DORSAL FOREARM
LOCATION DETAILED: LEFT ULNAR DORSAL HAND
LOCATION DETAILED: RIGHT SUPERIOR MEDIAL UPPER BACK
LOCATION DETAILED: MIDDLE STERNUM
LOCATION DETAILED: RIGHT PROXIMAL DORSAL FOREARM
LOCATION DETAILED: LEFT MIDDLE FINGER DISTAL INTERPHALANGEAL JOINT
LOCATION DETAILED: RIGHT DISTAL DORSAL SMALL FINGER

## 2017-10-31 ASSESSMENT — LOCATION SIMPLE DESCRIPTION DERM
LOCATION SIMPLE: RIGHT HAND
LOCATION SIMPLE: LEFT HAND
LOCATION SIMPLE: LEFT CHEEK
LOCATION SIMPLE: RIGHT SMALL FINGER
LOCATION SIMPLE: RIGHT FOREARM
LOCATION SIMPLE: LEFT LOWER BACK
LOCATION SIMPLE: LEFT MIDDLE FINGER
LOCATION SIMPLE: LEFT FOREARM
LOCATION SIMPLE: CHEST
LOCATION SIMPLE: RIGHT UPPER BACK

## 2017-10-31 ASSESSMENT — LOCATION ZONE DERM
LOCATION ZONE: FACE
LOCATION ZONE: FINGER
LOCATION ZONE: TRUNK
LOCATION ZONE: ARM
LOCATION ZONE: HAND

## 2017-11-29 ENCOUNTER — APPOINTMENT (RX ONLY)
Dept: URBAN - METROPOLITAN AREA CLINIC 4 | Facility: CLINIC | Age: 82
Setting detail: DERMATOLOGY
End: 2017-11-29

## 2017-11-29 DIAGNOSIS — D69.2 OTHER NONTHROMBOCYTOPENIC PURPURA: ICD-10-CM

## 2017-11-29 PROCEDURE — 99212 OFFICE O/P EST SF 10 MIN: CPT

## 2017-11-29 PROCEDURE — ? COUNSELING

## 2017-11-29 ASSESSMENT — LOCATION SIMPLE DESCRIPTION DERM
LOCATION SIMPLE: LEFT PRETIBIAL REGION
LOCATION SIMPLE: RIGHT PRETIBIAL REGION

## 2017-11-29 ASSESSMENT — LOCATION DETAILED DESCRIPTION DERM
LOCATION DETAILED: LEFT DISTAL PRETIBIAL REGION
LOCATION DETAILED: RIGHT DISTAL PRETIBIAL REGION

## 2017-11-29 ASSESSMENT — LOCATION ZONE DERM: LOCATION ZONE: LEG

## 2017-11-29 NOTE — HPI: RASH
Is This A New Presentation, Or A Follow-Up?: Rash
Additional History: Went on a hike Sunday 19 , noticed the rash the next day.

## 2018-01-31 ENCOUNTER — OFFICE VISIT (OUTPATIENT)
Dept: CARDIOLOGY | Facility: MEDICAL CENTER | Age: 83
End: 2018-01-31
Payer: MEDICARE

## 2018-01-31 VITALS
SYSTOLIC BLOOD PRESSURE: 126 MMHG | DIASTOLIC BLOOD PRESSURE: 72 MMHG | OXYGEN SATURATION: 94 % | HEIGHT: 69 IN | WEIGHT: 152 LBS | BODY MASS INDEX: 22.51 KG/M2 | HEART RATE: 66 BPM

## 2018-01-31 DIAGNOSIS — Z86.39 HISTORY OF HYPERCHOLESTEROLEMIA: Chronic | ICD-10-CM

## 2018-01-31 DIAGNOSIS — I34.0 NON-RHEUMATIC MITRAL REGURGITATION: ICD-10-CM

## 2018-01-31 DIAGNOSIS — I77.1 ILIAC ARTERY STENOSIS, LEFT (HCC): ICD-10-CM

## 2018-01-31 DIAGNOSIS — R00.2 PALPITATIONS: Chronic | ICD-10-CM

## 2018-01-31 DIAGNOSIS — Z01.810 PRE-OPERATIVE CARDIOVASCULAR EXAMINATION: ICD-10-CM

## 2018-01-31 PROCEDURE — 99214 OFFICE O/P EST MOD 30 MIN: CPT | Performed by: INTERNAL MEDICINE

## 2018-01-31 PROCEDURE — 93000 ELECTROCARDIOGRAM COMPLETE: CPT | Performed by: INTERNAL MEDICINE

## 2018-01-31 RX ORDER — ROSUVASTATIN CALCIUM 5 MG/1
5 TABLET, COATED ORAL EVERY EVENING
Qty: 30 TAB | Refills: 11 | Status: SHIPPED | OUTPATIENT
Start: 2018-01-31 | End: 2018-07-31

## 2018-01-31 ASSESSMENT — ENCOUNTER SYMPTOMS
DIZZINESS: 0
MYALGIAS: 1
DEPRESSION: 0
LOSS OF CONSCIOUSNESS: 0
CLAUDICATION: 0
ORTHOPNEA: 0
PND: 0
NERVOUS/ANXIOUS: 0
ABDOMINAL PAIN: 0
BLURRED VISION: 0
FEVER: 0
BRUISES/BLEEDS EASILY: 0
SHORTNESS OF BREATH: 0
CHILLS: 0
SPEECH CHANGE: 0

## 2018-01-31 NOTE — PROGRESS NOTES
Subjective:   Rosemarie Guajardo is a pleasant 82-year-old female with known history of  1. Dyslipidemia  2. 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     Patient is very active. Denied any complaints of exertional chest pain pressure or tightness. No complaints of palpitations orthopnea or PND. No complaints of lower extremity edema or claudication.  No complaints of dizziness lightheadedness R LOC.    Past Medical History:   Diagnosis Date   • History of hypercholesterolemia     • Mitral regurgitation October 2015    Echocardiogram with normal LV size, LVEF 60%. Enlarged RA. Mild MR, mild TR. RVSP 40mmHg.   • Palpitations     • WPW (Yoon-Parkinson-White syndrome) 1970s    Originally diagnosed in [place], [place].     Past Surgical History:   Procedure Laterality Date   • CATARACT EXTRACTION WITH IOL     • CERVICAL DISK AND FUSION ANTERIOR       Family History   Problem Relation Age of Onset   • Heart Disease Mother      atrial fibrillation   • Lung Disease Father      PNA   • Stroke Father      CVA     History   Smoking Status   • Never Smoker   Smokeless Tobacco   • Never Used     Allergies   Allergen Reactions   • Statins [Hmg-Coa-R Inhibitors]      Intolerance   • Codeine Nausea     BP drops     Outpatient Encounter Prescriptions as of 1/31/2018   Medication Sig Dispense Refill   • LUTEIN PO Take  by mouth.     • fluticasone (FLONASE) 50 MCG/ACT nasal spray Spray 2 Sprays in nose as needed.     • coenzyme Q-10 100 MG Cap capsule Take 1 Cap by mouth every day. 30 Cap 11   • Probiotic Product (PROBIOTIC DAILY PO) Take  by mouth.     • Fiber POWD Take  by mouth as needed.     • PRAVASTATIN SODIUM PO Take  by mouth.     • Multiple Vitamins-Minerals (WOMENS MULTI VITAMIN & MINERAL PO) Take  by mouth every day.     • Cholecalciferol (VITAMIN D3) 2000 UNITS Tab Take  by mouth.       No facility-administered encounter medications on file as of 1/31/2018.   "    Review of Systems   Constitutional: Negative for chills and fever.   HENT: Negative for congestion.    Eyes: Negative for blurred vision.   Respiratory: Negative for shortness of breath.    Cardiovascular: Negative for chest pain, palpitations, orthopnea, claudication, leg swelling and PND.   Gastrointestinal: Negative for abdominal pain.   Musculoskeletal: Positive for myalgias. Negative for joint pain.   Skin: Negative for rash.   Neurological: Negative for dizziness, speech change and loss of consciousness.   Endo/Heme/Allergies: Does not bruise/bleed easily.   Psychiatric/Behavioral: Negative for depression. The patient is not nervous/anxious.    All other systems reviewed and are negative.       Objective:   /72   Pulse 66   Ht 1.753 m (5' 9\")   Wt 68.9 kg (152 lb)   SpO2 94%   BMI 22.45 kg/m²     Physical Exam   Constitutional: She is oriented to person, place, and time. She appears well-developed. No distress.   HENT:   Mouth/Throat: Mucous membranes are normal.   Eyes: Conjunctivae and EOM are normal.   Neck: No JVD present. No tracheal deviation present. No thyroid mass present.   Cardiovascular: Normal rate and regular rhythm.    Murmur heard.   Systolic murmur is present with a grade of 2/6   Pulses:       Radial pulses are 2+ on the right side, and 2+ on the left side.        Dorsalis pedis pulses are 2+ on the right side, and 2+ on the left side.   Systolic murmur best heard in the mitral area   Pulmonary/Chest: Effort normal and breath sounds normal. No respiratory distress. She exhibits no tenderness.   Abdominal: Soft. There is no tenderness.   Musculoskeletal: Normal range of motion. She exhibits no edema.   Neurological: She is alert and oriented to person, place, and time. She has normal strength. She displays no tremor.   Skin: Skin is warm and dry. She is not diaphoretic.   Psychiatric: She has a normal mood and affect. Her behavior is normal.   Vitals reviewed.   Results for " ALEIDA DEL ROSARIO ROMEO (MRN [id number]) as of 2018 14:02   2016    Sodium 143 141   Potassium 4.3 4.0   Chloride 103 99   Co2 24 23   Glucose 100 (H) 104 (H)   Bun 14 10   Creatinine 0.76 0.80   GFR If Non African American 74 69   Bun-Creatinine Ratio 18 13   Calcium 9.2 9.4   AST(SGOT) 36 22   ALT(SGPT) 19 17   Alkaline Phosphatase 62 62   Cholesterol,Tot 168 231 (H)   Triglycerides 103 165 (H)   HDL 61 67   LDL 86 131 (H)   VLDL Cholesterol Calc 21 33       48 hour Holter monitor: 17  Tracings were personally reviewed by me along with patient.  Underlying rhythm was sinus with minimum heart rate of 48 bpm and have a target of 58 BPM.  Most of the times when patient was symptomatic underlying rhythm was sinus one episode patient rhythm was sinus with PVC.  2 atrial run seen. Longest run was 6 beats at a heart rate of 125 BPM asymptomatic.    Stress echo: 12/22/15  Fair exercise tolerance, achieving 10.1 METS and 99 % of max predicted heart rate.   Negative stress echo for ischemia with adequate stress achieved by   heart rate criteria.   False positive stress EKG. Frequent PAC's    Abdominal aortic Doppler: 11/30/15  No evidence of abdominal aortic aneurysm.   Velocities are elevated in the left external iliac artery at the mid level   consistent with greater than 50% stenosis    TTE: 10/13/15  No prior study is available for comparison.   Left ventricular ejection fraction is visually estimated to be 60%. Normal regional wall motion. Grade I diastolic dysfunction.  Mild mitral regurgitation.  Mild tricuspid regurgitation. Right ventricular systolic pressure is estimated to be 40 mmHg    EK/29/15  Sinus bradycardia 57 bpm normal axis T wave inversions V3 to V6  No evidence of WPW on EKG  Assessment:     1. Dyslipidemia  2. Mitral regurgitation  3. Palpitations  4. PVD  5. Preop  Medical Decision Making:  Today's Assessment / Status / Plan:     1. Patient is agreeable to start  Crestor.  Start 2.5 mg every other day if no myalgias increased to 2.5 mg daily no issues only then go up to 5 mg daily  Recheck labs in 3 months.  2. Repeat echo in 2 years.  3. No further episodes of palpitations reassurance for now..  4. Abdominal aortic Doppler prior to next visit.  5. Patient is a low risk candidate being scheduled for a low risk procedure no further cardiac workup needed prior to surgery.  EKG reviewed.    Follow-up with Vicki in 6 months   Aortic Doppler in 6 months labs in 2-3 months.     Thank you for allowing me to participate in taking care of patient.    Viry Tamayo MD.

## 2018-01-31 NOTE — LETTER
[facility]   [address]   [address]  [place] [address]  Phone: [phone]  Fax: [phone]              Rosemarie Guajardo  1935    Encounter Date: 1/31/2018    Viry Ewing M.D.          PROGRESS NOTE:  Subjective:   Rosemarie Guajardo is a pleasant 82-year-old female with known history of  1. Dyslipidemia  2. 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     Patient is very active. Denied any complaints of exertional chest pain pressure or tightness. No complaints of palpitations orthopnea or PND. No complaints of lower extremity edema or claudication.  No complaints of dizziness lightheadedness R LOC.    Past Medical History:   Diagnosis Date   • History of hypercholesterolemia     • Mitral regurgitation October 2015    Echocardiogram with normal LV size, LVEF 60%. Enlarged RA. Mild MR, mild TR. RVSP 40mmHg.   • Palpitations     • WPW (Yoon-Parkinson-White syndrome) 1970s    Originally diagnosed in [place], [place].     Past Surgical History:   Procedure Laterality Date   • CATARACT EXTRACTION WITH IOL     • CERVICAL DISK AND FUSION ANTERIOR       Family History   Problem Relation Age of Onset   • Heart Disease Mother      atrial fibrillation   • Lung Disease Father      PNA   • Stroke Father      CVA     History   Smoking Status   • Never Smoker   Smokeless Tobacco   • Never Used     Allergies   Allergen Reactions   • Statins [Hmg-Coa-R Inhibitors]      Intolerance   • Codeine Nausea     BP drops     Outpatient Encounter Prescriptions as of 1/31/2018   Medication Sig Dispense Refill   • LUTEIN PO Take  by mouth.     • fluticasone (FLONASE) 50 MCG/ACT nasal spray Spray 2 Sprays in nose as needed.     • coenzyme Q-10 100 MG Cap capsule Take 1 Cap by mouth every day. 30 Cap 11   • Probiotic Product (PROBIOTIC DAILY PO) Take  by mouth.     • Fiber POWD Take  by mouth as  "needed.     • PRAVASTATIN SODIUM PO Take  by mouth.     • Multiple Vitamins-Minerals (WOMENS MULTI VITAMIN & MINERAL PO) Take  by mouth every day.     • Cholecalciferol (VITAMIN D3) 2000 UNITS Tab Take  by mouth.       No facility-administered encounter medications on file as of 1/31/2018.      Review of Systems   Constitutional: Negative for chills and fever.   HENT: Negative for congestion.    Eyes: Negative for blurred vision.   Respiratory: Negative for shortness of breath.    Cardiovascular: Negative for chest pain, palpitations, orthopnea, claudication, leg swelling and PND.   Gastrointestinal: Negative for abdominal pain.   Musculoskeletal: Positive for myalgias. Negative for joint pain.   Skin: Negative for rash.   Neurological: Negative for dizziness, speech change and loss of consciousness.   Endo/Heme/Allergies: Does not bruise/bleed easily.   Psychiatric/Behavioral: Negative for depression. The patient is not nervous/anxious.    All other systems reviewed and are negative.       Objective:   /72   Pulse 66   Ht 1.753 m (5' 9\")   Wt 68.9 kg (152 lb)   SpO2 94%   BMI 22.45 kg/m²      Physical Exam   Constitutional: She is oriented to person, place, and time. She appears well-developed. No distress.   HENT:   Mouth/Throat: Mucous membranes are normal.   Eyes: Conjunctivae and EOM are normal.   Neck: No JVD present. No tracheal deviation present. No thyroid mass present.   Cardiovascular: Normal rate and regular rhythm.    Murmur heard.   Systolic murmur is present with a grade of 2/6   Pulses:       Radial pulses are 2+ on the right side, and 2+ on the left side.        Dorsalis pedis pulses are 2+ on the right side, and 2+ on the left side.   Systolic murmur best heard in the mitral area   Pulmonary/Chest: Effort normal and breath sounds normal. No respiratory distress. She exhibits no tenderness.   Abdominal: Soft. There is no tenderness.   Musculoskeletal: Normal range of motion. She exhibits " no edema.   Neurological: She is alert and oriented to person, place, and time. She has normal strength. She displays no tremor.   Skin: Skin is warm and dry. She is not diaphoretic.   Psychiatric: She has a normal mood and affect. Her behavior is normal.   Vitals reviewed.   Results for ALEIDA DEL ROSARIO (MRN [id number]) as of 2018 14:02   2016    Sodium 143 141   Potassium 4.3 4.0   Chloride 103 99   Co2 24 23   Glucose 100 (H) 104 (H)   Bun 14 10   Creatinine 0.76 0.80   GFR If Non African American 74 69   Bun-Creatinine Ratio 18 13   Calcium 9.2 9.4   AST(SGOT) 36 22   ALT(SGPT) 19 17   Alkaline Phosphatase 62 62   Cholesterol,Tot 168 231 (H)   Triglycerides 103 165 (H)   HDL 61 67   LDL 86 131 (H)   VLDL Cholesterol Calc 21 33       48 hour Holter monitor: 17  Tracings were personally reviewed by me along with patient.  Underlying rhythm was sinus with minimum heart rate of 48 bpm and have a target of 58 BPM.  Most of the times when patient was symptomatic underlying rhythm was sinus one episode patient rhythm was sinus with PVC.  2 atrial run seen. Longest run was 6 beats at a heart rate of 125 BPM asymptomatic.    Stress echo: 12/22/15  Fair exercise tolerance, achieving 10.1 METS and 99 % of max predicted heart rate.   Negative stress echo for ischemia with adequate stress achieved by   heart rate criteria.   False positive stress EKG. Frequent PAC's    Abdominal aortic Doppler: 11/30/15  No evidence of abdominal aortic aneurysm.   Velocities are elevated in the left external iliac artery at the mid level   consistent with greater than 50% stenosis    TTE: 10/13/15  No prior study is available for comparison.   Left ventricular ejection fraction is visually estimated to be 60%. Normal regional wall motion. Grade I diastolic dysfunction.  Mild mitral regurgitation.  Mild tricuspid regurgitation. Right ventricular systolic pressure is estimated to be 40 mmHg    EK/29/15  Sinus  bradycardia 57 bpm normal axis T wave inversions V3 to V6  No evidence of WPW on EKG  Assessment:     1. Dyslipidemia  2. Mitral regurgitation  3. Palpitations  4. PVD  5. Preop  Medical Decision Making:  Today's Assessment / Status / Plan:     1. Patient is agreeable to start Crestor.  Start 2.5 mg every other day if no myalgias increased to 2.5 mg daily no issues only then go up to 5 mg daily  Recheck labs in 3 months.  2. Repeat echo in 2 years.  3. No further episodes of palpitations reassurance for now..  4. Abdominal aortic Doppler prior to next visit.  5. Patient is a low risk candidate being scheduled for a low risk procedure no further cardiac workup needed prior to surgery.    Follow-up with Vicki in 6 months   Aortic Doppler in 6 months labs in 2-3 months.     Thank you for allowing me to participate in taking care of patient.    Viry Tamayo MD.        Jayden Newman M.D.  [address] [facility] [address]  VIA Facsimile: [phone]

## 2018-02-01 LAB — EKG IMPRESSION: NORMAL

## 2018-02-01 ASSESSMENT — ENCOUNTER SYMPTOMS: PALPITATIONS: 0

## 2018-02-08 ENCOUNTER — HOSPITAL ENCOUNTER (OUTPATIENT)
Dept: RADIOLOGY | Facility: MEDICAL CENTER | Age: 83
End: 2018-02-08
Attending: INTERNAL MEDICINE
Payer: MEDICARE

## 2018-02-08 DIAGNOSIS — I77.1 ILIAC ARTERY STENOSIS, LEFT (HCC): ICD-10-CM

## 2018-02-08 PROCEDURE — 76775 US EXAM ABDO BACK WALL LIM: CPT

## 2018-02-14 ENCOUNTER — TELEPHONE (OUTPATIENT)
Dept: CARDIOLOGY | Facility: MEDICAL CENTER | Age: 83
End: 2018-02-14

## 2018-02-15 NOTE — TELEPHONE ENCOUNTER
----- Message from Kera Blanc R.N. sent at 2/14/2018  8:51 AM PST -----  Follow up is in July  ----- Message -----  From: Viry Ewing M.D.  Sent: 2/13/2018  12:27 PM  To: Kera Blanc R.N.    No evidence of aneurysm on aortic Doppler  Elevated velocities in iliac artery could be due to tortuosity rather than blockage   Vicki can review at follow-up.  ----- Message -----  From: Kera Blanc R.N.  Sent: 2/9/2018   1:05 PM  To: Viry Ewing M.D.    Next appt:  07/31/2018 at 10:20 AM in Cardiology (Vicki Mcleod, A.P.NKarina) Dx

## 2018-07-31 ENCOUNTER — OFFICE VISIT (OUTPATIENT)
Dept: CARDIOLOGY | Facility: MEDICAL CENTER | Age: 83
End: 2018-07-31
Payer: MEDICARE

## 2018-07-31 VITALS
WEIGHT: 151 LBS | BODY MASS INDEX: 22.36 KG/M2 | DIASTOLIC BLOOD PRESSURE: 80 MMHG | SYSTOLIC BLOOD PRESSURE: 140 MMHG | HEART RATE: 68 BPM | HEIGHT: 69 IN | OXYGEN SATURATION: 95 %

## 2018-07-31 DIAGNOSIS — R00.2 PALPITATIONS: Chronic | ICD-10-CM

## 2018-07-31 DIAGNOSIS — Z86.39 HISTORY OF HYPERCHOLESTEROLEMIA: Chronic | ICD-10-CM

## 2018-07-31 DIAGNOSIS — I34.0 MITRAL VALVE INSUFFICIENCY, UNSPECIFIED ETIOLOGY: ICD-10-CM

## 2018-07-31 DIAGNOSIS — I45.6 WPW (WOLFF-PARKINSON-WHITE SYNDROME): Chronic | ICD-10-CM

## 2018-07-31 PROCEDURE — 99214 OFFICE O/P EST MOD 30 MIN: CPT | Performed by: NURSE PRACTITIONER

## 2018-07-31 ASSESSMENT — ENCOUNTER SYMPTOMS
PALPITATIONS: 1
FEVER: 0
CHILLS: 0
COUGH: 0
DIZZINESS: 0
SHORTNESS OF BREATH: 0
PND: 0
NAUSEA: 0
MYALGIAS: 0
BRUISES/BLEEDS EASILY: 0
HEADACHES: 0
LOSS OF CONSCIOUSNESS: 0
ABDOMINAL PAIN: 0
ORTHOPNEA: 0

## 2018-07-31 NOTE — PROGRESS NOTES
Chief Complaint   Patient presents with   • Follow-Up   • Palpitations   • Hyperlipidemia       Subjective:   Rosemarie Gujaardo is a 82 y.o. female who presents today for six month follow-up of history of WPW/palpitaitons, hyperlipidemia and mild MR.    Rosemarie is an 82 year old female with history of WPW first diagnosed in the 1970s (although has been questioned over the years), palpitations, mild MR and hyperlipidemia, previously followed by Dr. Ewing.     At last follow-up, Dr. Ewing suggested starting Crestor 2.5mg QOD to work up to QD, but patient developed myalgias again, so she discontinued it. She is otherwise feeling well: occasional palpitations/skipped beats, but generally don't bother her.  No chest pain, pressure or discomfort; no shortness of breath, orthopnea or PND; no dizziness or syncope; no edema. She smoke is making her a little tired lately.    Past Medical History:   Diagnosis Date   • History of hypercholesterolemia     • Mitral regurgitation October 2015    Echocardiogram with normal LV size, LVEF 60%. Enlarged RA. Mild MR, mild TR. RVSP 40mmHg.   • Palpitations     • WPW (Yoon-Parkinson-White syndrome) 1970s    Originally diagnosed in [place], [place].     Past Surgical History:   Procedure Laterality Date   • CATARACT EXTRACTION WITH IOL     • CERVICAL DISK AND FUSION ANTERIOR       Family History   Problem Relation Age of Onset   • Heart Disease Mother         atrial fibrillation   • Lung Disease Father         PNA   • Stroke Father         CVA     Social History     Social History   • Marital status:      Spouse name: N/A   • Number of children: N/A   • Years of education: N/A     Occupational History   • Not on file.     Social History Main Topics   • Smoking status: Never Smoker   • Smokeless tobacco: Never Used   • Alcohol use 2.5 oz/week     5 Glasses of wine per week      Comment: weekly   • Drug use: No   • Sexual activity: Not on file     Other Topics Concern   •  "Not on file     Social History Narrative   • No narrative on file     Allergies   Allergen Reactions   • Statins [Hmg-Coa-R Inhibitors]      Intolerance   • Codeine Nausea     BP drops     Outpatient Encounter Prescriptions as of 7/31/2018   Medication Sig Dispense Refill   • LUTEIN PO Take  by mouth.     • fluticasone (FLONASE) 50 MCG/ACT nasal spray Spray 2 Sprays in nose as needed.     • coenzyme Q-10 100 MG Cap capsule Take 1 Cap by mouth every day. 30 Cap 11   • Cholecalciferol (VITAMIN D3) 2000 UNITS Tab Take  by mouth.     • [DISCONTINUED] rosuvastatin (CRESTOR) 5 MG Tab Take 1 Tab by mouth every evening. (Patient not taking: Reported on 7/31/2018) 30 Tab 11   • [DISCONTINUED] Multiple Vitamins-Minerals (WOMENS MULTI VITAMIN & MINERAL PO) Take  by mouth every day.     • [DISCONTINUED] Probiotic Product (PROBIOTIC DAILY PO) Take  by mouth.     • [DISCONTINUED] Fiber POWD Take  by mouth as needed.       No facility-administered encounter medications on file as of 7/31/2018.      Review of Systems   Constitutional: Negative for chills and fever.   HENT: Negative for congestion.    Respiratory: Negative for cough and shortness of breath.    Cardiovascular: Positive for palpitations. Negative for chest pain, orthopnea, leg swelling and PND.        Rare, nonsustained.   Gastrointestinal: Negative for abdominal pain and nausea.   Musculoskeletal: Negative for myalgias.   Skin: Negative for rash.   Neurological: Negative for dizziness, loss of consciousness and headaches.   Endo/Heme/Allergies: Does not bruise/bleed easily.        Objective:   /80   Pulse 68   Ht 1.753 m (5' 9\")   Wt 68.5 kg (151 lb)   SpO2 95%   BMI 22.30 kg/m²     Physical Exam   Constitutional: She is oriented to person, place, and time. She appears well-developed and well-nourished.   HENT:   Head: Normocephalic.   Eyes: EOM are normal.   Neck: Normal range of motion. Neck supple. No JVD present.   Cardiovascular: Normal rate, regular " rhythm and normal heart sounds.    Ectopy felt/heard.   Pulmonary/Chest: Effort normal and breath sounds normal. No respiratory distress. She has no wheezes. She has no rales.   Abdominal: Soft. Bowel sounds are normal. She exhibits no distension. There is no tenderness.   Musculoskeletal: Normal range of motion. She exhibits no edema.   Neurological: She is alert and oriented to person, place, and time.   Skin: Skin is warm and dry. No rash noted.   Psychiatric: She has a normal mood and affect.     RESULTS OF ABDOMINAL ULTRASOUND OF 2/8/2018 - REVIEWED WITH PATIENT:  No abnormal aortic aneurysm identified.  Elevated peak systolic velocities within the left external iliac artery with no focal stenotic lesions identified. This may be related to marked vessel tortuosity.  Elevated peak systolic velocity within the right external iliac artery with no focal stenotic lesions identified.    LABS AS OF 7/19/2018 - REVIEWED WITH PATIENT:  Potassium 3.9  Glucose 98  BUN 13  Creatinine 0.9  AST 24  ALT 28  Cholesterol 201  Triglycerides 120  HDL 67    Cholesterol/HDL ratio 3.0    Assessment:     1. Palpitations     2. WPW (Yoon-Parkinson-White syndrome)     3. Mitral valve insufficiency, unspecified etiology  ECHOCARDIOGRAM COMP W/O CONT   4. History of hypercholesterolemia         Medical Decision Making:  Today's Assessment / Status / Plan:     1. Palpitations, with history of WPW in the past, stable/non-problematic.  If start to increase in frequency of intensity, to let us know.    2. Mild MR on echo in 2015, to repeat echocardiogram.    3. Hyperlipidemia, not currently on any therapy. Recent lipid panel was good. She is intolerant to statins in the past.    Same medications for now. FU in 6 months with Dr. Marcus to establish care; FU sooner if clinical condition changes.    Collaborating MD: KAUR Moreira

## 2018-07-31 NOTE — LETTER
[facility]   [address]   [address]   [place] [address]  Phone: [phone]  Fax: [phone]              Rosemarie Guajardo  1935    Encounter Date: 7/31/2018    DONA Voss          PROGRESS NOTE:  Chief Complaint   Patient presents with   • Follow-Up   • Palpitations   • Hyperlipidemia       Subjective:   Rosemarie Guajardo is a 82 y.o. female who presents today for six month follow-up of history of WPW/palpitaitons, hyperlipidemia and mild MR.    Rosemarie is an 82 year old female with history of WPW first diagnosed in the 1970s (although has been questioned over the years), palpitations, mild MR and hyperlipidemia, previously followed by Dr. Ewing.     At last follow-up, Dr. Ewing suggested starting Crestor 2.5mg QOD to work up to QD, but patient developed myalgias again, so she discontinued it. She is otherwise feeling well: occasional palpitations/skipped beats, but generally don't bother her.  No chest pain, pressure or discomfort; no shortness of breath, orthopnea or PND; no dizziness or syncope; no edema. She smoke is making her a little tired lately.    Past Medical History:   Diagnosis Date   • History of hypercholesterolemia     • Mitral regurgitation October 2015    Echocardiogram with normal LV size, LVEF 60%. Enlarged RA. Mild MR, mild TR. RVSP 40mmHg.   • Palpitations     • WPW (Yoon-Parkinson-White syndrome) 1970s    Originally diagnosed in [place], [place].     Past Surgical History:   Procedure Laterality Date   • CATARACT EXTRACTION WITH IOL     • CERVICAL DISK AND FUSION ANTERIOR       Family History   Problem Relation Age of Onset   • Heart Disease Mother         atrial fibrillation   • Lung Disease Father         PNA   • Stroke Father         CVA     Social History     Social History   • Marital status:      Spouse name: N/A   • Number of children: N/A   • Years of education: N/A      "    Occupational History   • Not on file.     Social History Main Topics   • Smoking status: Never Smoker   • Smokeless tobacco: Never Used   • Alcohol use 2.5 oz/week     5 Glasses of wine per week      Comment: weekly   • Drug use: No   • Sexual activity: Not on file     Other Topics Concern   • Not on file     Social History Narrative   • No narrative on file     Allergies   Allergen Reactions   • Statins [Hmg-Coa-R Inhibitors]      Intolerance   • Codeine Nausea     BP drops     Outpatient Encounter Prescriptions as of 7/31/2018   Medication Sig Dispense Refill   • LUTEIN PO Take  by mouth.     • fluticasone (FLONASE) 50 MCG/ACT nasal spray Spray 2 Sprays in nose as needed.     • coenzyme Q-10 100 MG Cap capsule Take 1 Cap by mouth every day. 30 Cap 11   • Cholecalciferol (VITAMIN D3) 2000 UNITS Tab Take  by mouth.     • [DISCONTINUED] rosuvastatin (CRESTOR) 5 MG Tab Take 1 Tab by mouth every evening. (Patient not taking: Reported on 7/31/2018) 30 Tab 11   • [DISCONTINUED] Multiple Vitamins-Minerals (WOMENS MULTI VITAMIN & MINERAL PO) Take  by mouth every day.     • [DISCONTINUED] Probiotic Product (PROBIOTIC DAILY PO) Take  by mouth.     • [DISCONTINUED] Fiber POWD Take  by mouth as needed.       No facility-administered encounter medications on file as of 7/31/2018.      Review of Systems   Constitutional: Negative for chills and fever.   HENT: Negative for congestion.    Respiratory: Negative for cough and shortness of breath.    Cardiovascular: Positive for palpitations. Negative for chest pain, orthopnea, leg swelling and PND.        Rare, nonsustained.   Gastrointestinal: Negative for abdominal pain and nausea.   Musculoskeletal: Negative for myalgias.   Skin: Negative for rash.   Neurological: Negative for dizziness, loss of consciousness and headaches.   Endo/Heme/Allergies: Does not bruise/bleed easily.        Objective:   /80   Pulse 68   Ht 1.753 m (5' 9\")   Wt 68.5 kg (151 lb)   SpO2 95%   " BMI 22.30 kg/m²      Physical Exam   Constitutional: She is oriented to person, place, and time. She appears well-developed and well-nourished.   HENT:   Head: Normocephalic.   Eyes: EOM are normal.   Neck: Normal range of motion. Neck supple. No JVD present.   Cardiovascular: Normal rate, regular rhythm and normal heart sounds.    Ectopy felt/heard.   Pulmonary/Chest: Effort normal and breath sounds normal. No respiratory distress. She has no wheezes. She has no rales.   Abdominal: Soft. Bowel sounds are normal. She exhibits no distension. There is no tenderness.   Musculoskeletal: Normal range of motion. She exhibits no edema.   Neurological: She is alert and oriented to person, place, and time.   Skin: Skin is warm and dry. No rash noted.   Psychiatric: She has a normal mood and affect.     RESULTS OF ABDOMINAL ULTRASOUND OF 2/8/2018 - REVIEWED WITH PATIENT:  No abnormal aortic aneurysm identified.  Elevated peak systolic velocities within the left external iliac artery with no focal stenotic lesions identified. This may be related to marked vessel tortuosity.  Elevated peak systolic velocity within the right external iliac artery with no focal stenotic lesions identified.    LABS AS OF 7/19/2018 - REVIEWED WITH PATIENT:  Potassium 3.9  Glucose 98  BUN 13  Creatinine 0.9  AST 24  ALT 28  Cholesterol 201  Triglycerides 120  HDL 67    Cholesterol/HDL ratio 3.0    Assessment:     1. Palpitations     2. WPW (Yoon-Parkinson-White syndrome)     3. Mitral valve insufficiency, unspecified etiology  ECHOCARDIOGRAM COMP W/O CONT   4. History of hypercholesterolemia         Medical Decision Making:  Today's Assessment / Status / Plan:     1. Palpitations, with history of WPW in the past, stable/non-problematic.  If start to increase in frequency of intensity, to let us know.    2. Mild MR on echo in 2015, to repeat echocardiogram.    3. Hyperlipidemia, not currently on any therapy. Recent lipid panel was good. She  is intolerant to statins in the past.    Same medications for now. FU in 6 months with Dr. Marcus to establish care; FU sooner if clinical condition changes.    Collaborating MD: KAUR Moreira      No Recipients

## 2018-08-27 ENCOUNTER — HOSPITAL ENCOUNTER (OUTPATIENT)
Dept: CARDIOLOGY | Facility: MEDICAL CENTER | Age: 83
End: 2018-08-27
Attending: NURSE PRACTITIONER
Payer: MEDICARE

## 2018-08-27 DIAGNOSIS — I34.0 MITRAL VALVE INSUFFICIENCY, UNSPECIFIED ETIOLOGY: ICD-10-CM

## 2018-08-27 PROCEDURE — 93306 TTE W/DOPPLER COMPLETE: CPT | Mod: 26 | Performed by: INTERNAL MEDICINE

## 2018-08-27 PROCEDURE — 93306 TTE W/DOPPLER COMPLETE: CPT

## 2018-08-28 LAB
LV EJECT FRACT MOD 2C 99903: 52.96
LV EJECT FRACT MOD 4C 99902: 48.8
LV EJECT FRACT MOD BP 99901: 47.59

## 2018-08-29 ENCOUNTER — TELEPHONE (OUTPATIENT)
Dept: CARDIOLOGY | Facility: MEDICAL CENTER | Age: 83
End: 2018-08-29

## 2018-08-29 NOTE — TELEPHONE ENCOUNTER
I informed her of her echo and told her I will  let her know if there is anything she needs to know about when Vicki reviews it.      Otherwise; she went on a hike and her BP is higher than usual.  She says she will call back tomorrow if it doesn't go down or consult with Dr. Newman.  Unfortunately, I did not get the reading because she said it was already improving.

## 2018-08-29 NOTE — TELEPHONE ENCOUNTER
----- Message from Isabelle Marie sent at 8/29/2018 12:07 PM PDT -----  Contact: [phone]    Pt is calling for results of Echo, also reports she having a bad reaction but does not know what the cause may be. She would please like a call back at [phone].

## 2018-09-10 ENCOUNTER — APPOINTMENT (RX ONLY)
Dept: URBAN - METROPOLITAN AREA CLINIC 4 | Facility: CLINIC | Age: 83
Setting detail: DERMATOLOGY
End: 2018-09-10

## 2018-09-10 DIAGNOSIS — L81.4 OTHER MELANIN HYPERPIGMENTATION: ICD-10-CM

## 2018-09-10 DIAGNOSIS — L82.1 OTHER SEBORRHEIC KERATOSIS: ICD-10-CM

## 2018-09-10 DIAGNOSIS — L57.8 OTHER SKIN CHANGES DUE TO CHRONIC EXPOSURE TO NONIONIZING RADIATION: ICD-10-CM

## 2018-09-10 PROCEDURE — ? LIQUID NITROGEN

## 2018-09-10 PROCEDURE — ? TREATMENT REGIMEN

## 2018-09-10 PROCEDURE — 99213 OFFICE O/P EST LOW 20 MIN: CPT

## 2018-09-10 ASSESSMENT — LOCATION SIMPLE DESCRIPTION DERM
LOCATION SIMPLE: LEFT FOREHEAD
LOCATION SIMPLE: INFERIOR FOREHEAD
LOCATION SIMPLE: GLABELLA
LOCATION SIMPLE: RIGHT FOREHEAD

## 2018-09-10 ASSESSMENT — LOCATION DETAILED DESCRIPTION DERM
LOCATION DETAILED: RIGHT LATERAL FOREHEAD
LOCATION DETAILED: RIGHT FOREHEAD
LOCATION DETAILED: INFERIOR MID FOREHEAD
LOCATION DETAILED: GLABELLA
LOCATION DETAILED: LEFT FOREHEAD
LOCATION DETAILED: LEFT LATERAL FOREHEAD
LOCATION DETAILED: LEFT INFERIOR MEDIAL FOREHEAD

## 2018-09-10 ASSESSMENT — LOCATION ZONE DERM: LOCATION ZONE: FACE

## 2018-09-10 NOTE — PROCEDURE: TREATMENT REGIMEN
Plan: See if she still has hydroquinone at home, if not give us a call and we will send it to her pharmacy
Detail Level: Detailed

## 2018-09-10 NOTE — PROCEDURE: LIQUID NITROGEN
Consent: The patient's consent was obtained including but not limited to risks of crusting, scabbing, blistering, scarring, darker or lighter pigmentary change, recurrence, incomplete removal and infection.
Add 52 Modifier (Optional): no
Detail Level: Detailed
Post-Care Instructions: I reviewed with the patient in detail post-care instructions. Patient is to wear sunprotection, and avoid picking at any of the treated lesions. Pt may apply Vaseline to crusted or scabbing areas.
Medical Necessity Information: It is in your best interest to select a reason for this procedure from the list below. All of these items fulfill various CMS LCD requirements except the new and changing color options.
Duration Of Freeze Thaw-Cycle (Seconds): 0
Medical Necessity Clause: This procedure was medically necessary because the lesions that were treated were:

## 2018-12-05 ENCOUNTER — OFFICE VISIT (OUTPATIENT)
Dept: CARDIOLOGY | Facility: MEDICAL CENTER | Age: 83
End: 2018-12-05
Payer: MEDICARE

## 2018-12-05 VITALS
SYSTOLIC BLOOD PRESSURE: 120 MMHG | WEIGHT: 150 LBS | HEIGHT: 69 IN | HEART RATE: 76 BPM | BODY MASS INDEX: 22.22 KG/M2 | OXYGEN SATURATION: 98 % | DIASTOLIC BLOOD PRESSURE: 70 MMHG

## 2018-12-05 DIAGNOSIS — I45.6 WPW (WOLFF-PARKINSON-WHITE SYNDROME): Chronic | ICD-10-CM

## 2018-12-05 DIAGNOSIS — I34.0 MITRAL VALVE INSUFFICIENCY, UNSPECIFIED ETIOLOGY: ICD-10-CM

## 2018-12-05 DIAGNOSIS — R00.2 PALPITATIONS: Chronic | ICD-10-CM

## 2018-12-05 DIAGNOSIS — Z86.39 HISTORY OF HYPERCHOLESTEROLEMIA: Chronic | ICD-10-CM

## 2018-12-05 PROCEDURE — 99214 OFFICE O/P EST MOD 30 MIN: CPT | Performed by: INTERNAL MEDICINE

## 2018-12-05 RX ORDER — ASCORBIC ACID 500 MG
500 TABLET ORAL DAILY
COMMUNITY

## 2018-12-05 ASSESSMENT — ENCOUNTER SYMPTOMS
SHORTNESS OF BREATH: 0
NAUSEA: 0
HEARTBURN: 0
BLURRED VISION: 0
PND: 0
DOUBLE VISION: 0
COUGH: 0
WEIGHT LOSS: 0
BRUISES/BLEEDS EASILY: 0

## 2018-12-05 NOTE — PROGRESS NOTES
Chief Complaint   Patient presents with   • Palpitations     follow up       Subjective:   Rosemarie Guajardo is a 83 y.o. 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 Mild MR, mild AS (Vmax 2.0m/s, peak 16mmHg, mean 10mmHg), no AI. Mild TR. RVSP 40mmHg.   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     Past Surgical History:   Procedure Laterality Date   • CATARACT EXTRACTION WITH IOL     • CERVICAL DISK AND FUSION ANTERIOR       Family History   Problem Relation Age of Onset   • Heart Disease Mother         atrial fibrillation   • Lung Disease Father         PNA   • Stroke Father         CVA     Social History     Social History   • Marital status:      Spouse name: N/A   • Number of children: N/A   • Years of education: N/A     Occupational History   • Not on file.     Social History Main Topics   • Smoking status: Never Smoker   • Smokeless tobacco: Never Used   • Alcohol use 2.5 oz/week     5 Glasses of wine per week      Comment: weekly   • Drug use: No   • Sexual activity: Not on file     Other Topics Concern   • Not on file     Social History Narrative   • No narrative on file     Allergies   Allergen Reactions   • Statins [Hmg-Coa-R Inhibitors]      Intolerance   • Codeine Nausea     BP drops     Outpatient Encounter Prescriptions as of 12/5/2018  "  Medication Sig Dispense Refill   • ascorbic acid (ASCORBIC ACID) 500 MG Tab Take 500 mg by mouth every day.     • Multiple Vitamin (MULTI VITAMIN DAILY PO) Take  by mouth.     • LUTEIN PO Take  by mouth.     • coenzyme Q-10 100 MG Cap capsule Take 1 Cap by mouth every day. 30 Cap 11   • Cholecalciferol (VITAMIN D3) 2000 UNITS Tab Take  by mouth.     • fluticasone (FLONASE) 50 MCG/ACT nasal spray Spray 2 Sprays in nose as needed.       No facility-administered encounter medications on file as of 12/5/2018.      Review of Systems   Constitutional: Negative for malaise/fatigue and weight loss.   HENT: Negative for hearing loss and tinnitus.    Eyes: Negative for blurred vision and double vision.   Respiratory: Negative for cough and shortness of breath.    Cardiovascular: Negative for chest pain, leg swelling and PND.   Gastrointestinal: Negative for heartburn and nausea.   Genitourinary: Negative for dysuria and urgency.   Endo/Heme/Allergies: Negative for environmental allergies. Does not bruise/bleed easily.   All other systems reviewed and are negative.       Objective:   /70 (BP Location: Left arm, Patient Position: Sitting)   Pulse 76   Ht 1.753 m (5' 9\")   Wt 68 kg (150 lb)   SpO2 98%   BMI 22.15 kg/m²     Physical Exam   Constitutional: She is oriented to person, place, and time. She appears well-developed and well-nourished.   HENT:   Head: Normocephalic and atraumatic.   Eyes: Pupils are equal, round, and reactive to light. EOM are normal.   Neck: No JVD present.   Cardiovascular: Normal rate, regular rhythm and intact distal pulses.    No murmur heard.  Pulmonary/Chest: Breath sounds normal. No respiratory distress.   Abdominal: Bowel sounds are normal. She exhibits no distension.   Musculoskeletal: She exhibits no edema or tenderness.   Lymphadenopathy:     She has no cervical adenopathy.   Neurological: She is alert and oriented to person, place, and time. She exhibits normal muscle tone. " Coordination normal.   Skin: Skin is warm and dry.   Psychiatric: She has a normal mood and affect. Her behavior is normal.       Assessment:     1. Palpitations  COMP METABOLIC PANEL    CBC WITH DIFFERENTIAL    LIPID PANEL    TSH WITH REFLEX TO FT4   2. WPW (Yoon-Parkinson-White syndrome)     3. Mitral valve insufficiency, unspecified etiology     4. History of hypercholesterolemia  COMP METABOLIC PANEL    CBC WITH DIFFERENTIAL    LIPID PANEL       Medical Decision Making:  Today's Assessment / Status / Plan:     Palpitations  Talked about Yoon-Parkinson-White and EKG  Current medications reviewed no setbacks or symptoms, avoid caffeine as she is doing discussed  Reviewed lab work and talked about thyroid testing and CBC annually    Valvular heart disease  We looked at the echocardiogram earlier this year which is normal and reassuring outside of mild and aortic stenosis which is now  Reassurance given talked about pathophysiology and possible progression    Hypercholesterolemia  Again with PAD should be high risk for events, discussed  Suggested having her and try at the lowest dose.  P lipids again this year with PCP    RTC one year sooner with concerns

## 2018-12-07 DIAGNOSIS — R00.2 PALPITATIONS: Chronic | ICD-10-CM

## 2018-12-07 DIAGNOSIS — Z86.39 HISTORY OF HYPERCHOLESTEROLEMIA: Chronic | ICD-10-CM

## 2019-02-11 ENCOUNTER — OFFICE VISIT (OUTPATIENT)
Dept: CARDIOLOGY | Facility: MEDICAL CENTER | Age: 84
End: 2019-02-11
Payer: MEDICARE

## 2019-02-11 VITALS
HEIGHT: 69 IN | DIASTOLIC BLOOD PRESSURE: 72 MMHG | BODY MASS INDEX: 22.96 KG/M2 | WEIGHT: 155 LBS | SYSTOLIC BLOOD PRESSURE: 130 MMHG | HEART RATE: 62 BPM | OXYGEN SATURATION: 94 %

## 2019-02-11 DIAGNOSIS — I45.6 WPW (WOLFF-PARKINSON-WHITE SYNDROME): Chronic | ICD-10-CM

## 2019-02-11 DIAGNOSIS — Z86.39 HISTORY OF HYPERCHOLESTEROLEMIA: Chronic | ICD-10-CM

## 2019-02-11 DIAGNOSIS — I34.0 MITRAL VALVE INSUFFICIENCY, UNSPECIFIED ETIOLOGY: ICD-10-CM

## 2019-02-11 DIAGNOSIS — R00.2 PALPITATIONS: Chronic | ICD-10-CM

## 2019-02-11 PROCEDURE — 99214 OFFICE O/P EST MOD 30 MIN: CPT | Performed by: NURSE PRACTITIONER

## 2019-02-11 RX ORDER — CIPROFLOXACIN 500 MG/1
TABLET, FILM COATED ORAL
COMMUNITY
Start: 2019-01-21 | End: 2019-02-11

## 2019-02-11 RX ORDER — AZITHROMYCIN 250 MG/1
TABLET, FILM COATED ORAL
COMMUNITY
Start: 2019-01-21 | End: 2019-02-11

## 2019-02-11 RX ORDER — CYCLOBENZAPRINE HCL 10 MG
TABLET ORAL
COMMUNITY
Start: 2019-01-11 | End: 2019-02-11

## 2019-02-11 ASSESSMENT — ENCOUNTER SYMPTOMS
NAUSEA: 0
LOSS OF CONSCIOUSNESS: 0
ABDOMINAL PAIN: 0
ORTHOPNEA: 0
PND: 0
CHILLS: 0
HEADACHES: 0
BRUISES/BLEEDS EASILY: 0
FEVER: 0
SHORTNESS OF BREATH: 0
DIZZINESS: 0
MYALGIAS: 0
PALPITATIONS: 1
COUGH: 0

## 2019-02-11 NOTE — LETTER
[facility]   [address]   [address]  [place] [address]  Phone: [phone]  Fax: [phone]              Rosemarie Guajardo  1935    Encounter Date: 2/11/2019    DONA Voss          PROGRESS NOTE:  Chief Complaint   Patient presents with   • Follow-Up   • Palpitations   • Hyperlipidemia       Subjective:   Rosemarie Guajardo is a 83 y.o. female who presents today for three month follow-up of WPW, palpitations and hyperlipidemia.    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 KAUR Moreira. She did see her in December 2018, but is here for early follow-up.    Generally, she is feeling fine. She does have occasional skipped beats, but very rare and nonsustained. No chest pain, pressure or discomfort; no shortness of breath, orthopnea or PND; no dizziness or syncope; no edema. She does have some knee pain, which limits her activity slightly.    Past Medical History:   Diagnosis Date   • History of hypercholesterolemia     • Mitral regurgitation 08/2018    Echocardiogram with normal LV size, mild LVH, LVEF 55%. Mild MR, mild AS (Vmax 2.0m/s, peak 16mmHg, mean 10mmHg), no AI. Mild TR. RVSP 40mmHg.   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     Past Surgical History:   Procedure Laterality Date   • CATARACT EXTRACTION WITH IOL     • CERVICAL DISK AND FUSION ANTERIOR       Family History   Problem Relation Age of Onset   • Heart Disease Mother         atrial fibrillation   • Lung Disease Father         PNA   • Stroke Father         CVA     Social History     Social History   • Marital status:      Spouse name: N/A   • Number of children: N/A   • Years of education: N/A     Occupational History   • Not on file.        Social History Main Topics   • Smoking status: Never Smoker   • Smokeless tobacco: Never Used   • Alcohol use 2.5 oz/week     5 Glasses of wine per week      Comment: weekly   • Drug use: No   • Sexual activity: Not on file     Other Topics Concern   • Not on file     Social History Narrative   • No narrative on file     Allergies   Allergen Reactions   • Statins [Hmg-Coa-R Inhibitors]      Intolerance   • Codeine Nausea     BP drops   • Doxycycline Vomiting   • Sulfamethoxazole-Trimethoprim      Other reaction(s): Unknown     Outpatient Encounter Prescriptions as of 2/11/2019   Medication Sig Dispense Refill   • Magnesium Oxide (MAGOX 400 PO) Take  by mouth.     • B Complex-C-Folic Acid (STRESS B COMPLEX PO) Take  by mouth.     • PANTOTHENIC ACID PO Take 75 mg by mouth.     • BIOTIN PO Take  by mouth.     • P-Aminobenzoic Acid (PABA PO) Take  by mouth.     • INOSITOL-5 PO Take  by mouth.     • Non Formulary Request elenthero 125 mg     • Non Formulary Request PB8 780mg     • ascorbic acid (ASCORBIC ACID) 500 MG Tab Take 500 mg by mouth every day.     • Multiple Vitamin (MULTI VITAMIN DAILY PO) Take  by mouth.     • LUTEIN PO Take  by mouth.     • fluticasone (FLONASE) 50 MCG/ACT nasal spray Spray 2 Sprays in nose as needed.     • coenzyme Q-10 100 MG Cap capsule Take 1 Cap by mouth every day. 30 Cap 11   • Cholecalciferol (VITAMIN D3) 2000 UNITS Tab Take  by mouth.     • [DISCONTINUED] azithromycin (ZITHROMAX) 250 MG Tab      • [DISCONTINUED] ciprofloxacin (CIPRO) 500 MG Tab      • [DISCONTINUED] cyclobenzaprine (FLEXERIL) 10 MG Tab        No facility-administered encounter medications on file as of 2/11/2019.      Review of Systems   Constitutional: Negative for chills and fever.   HENT: Negative for congestion.    Respiratory: Negative for cough and shortness of breath.    Cardiovascular: Positive for palpitations. Negative for chest pain, orthopnea, leg swelling and PND.        Rare, nonsustained.  "  Gastrointestinal: Negative for abdominal pain and nausea.   Musculoskeletal: Negative for myalgias.   Skin: Negative for rash.   Neurological: Negative for dizziness, loss of consciousness and headaches.   Endo/Heme/Allergies: Does not bruise/bleed easily.        Objective:   /72 (BP Location: Left arm, Patient Position: Sitting)   Pulse 62   Ht 1.753 m (5' 9\")   Wt 70.3 kg (155 lb)   SpO2 94%   BMI 22.89 kg/m²      Physical Exam   Constitutional: She is oriented to person, place, and time. She appears well-developed and well-nourished.   HENT:   Head: Normocephalic.   Eyes: EOM are normal.   Neck: Normal range of motion. Neck supple. No JVD present.   Cardiovascular: Normal rate, regular rhythm and normal heart sounds.    Pulmonary/Chest: Effort normal and breath sounds normal. No respiratory distress. She has no wheezes. She has no rales.   Abdominal: Soft. Bowel sounds are normal. She exhibits no distension. There is no tenderness.   Musculoskeletal: Normal range of motion. She exhibits no edema.   Neurological: She is alert and oriented to person, place, and time.   Skin: Skin is warm and dry. No rash noted.   Psychiatric: She has a normal mood and affect.     LABS AS OF 12/7/2018:  Potassium 4.0  Glucose 100  BUN 18  Creatinine 0.88  AST 17  ALT 22  Cholesterol 222  Triglycerides 87  HDL 66      CONCLUSIONS OF ECHOCARDIOGRAM OF 8/27/2018:  Prior echo on 10/13/15.  Normal left ventricular systolic function.   Calcified aortic valve leaflets. Borderline mild aortic stenosis. Vmax   is 2.0 m/s. Transvalvular gradients are - Peak: 16 mmHg, Mean: 10 mmHg.  Estimated right ventricular systolic pressure  is 40 mmHg.  Unable to open prior images for comparison.    Assessment:     1. WPW (Yoon-Parkinson-White syndrome)     2. Palpitations     3. Mitral valve insufficiency, unspecified etiology     4. History of hypercholesterolemia         Medical Decision Making:  Today's Assessment / Status / " Plan:     1. History of WPW, with rare palpitations, stable. She does remain on Magnesium.    2. Mild MR, stable on last echo.    3. Hyperlipidemia, not currently on any therapy. She is not interested in starting treatment.    Same medications for now. Keep FU with Dr. Moreira in December 2019; FU sooner if clinical condition changes.    Collaborating MD: Amrit Jaramillo [employer]

## 2019-03-18 ENCOUNTER — HOSPITAL ENCOUNTER (OUTPATIENT)
Dept: HOSPITAL 8 - RAD | Age: 84
Discharge: HOME | End: 2019-03-18
Attending: NEUROLOGICAL SURGERY
Payer: MEDICARE

## 2019-03-18 DIAGNOSIS — M47.812: Primary | ICD-10-CM

## 2019-03-18 PROCEDURE — 72050 X-RAY EXAM NECK SPINE 4/5VWS: CPT

## 2019-05-28 ENCOUNTER — HOSPITAL ENCOUNTER (OUTPATIENT)
Dept: HOSPITAL 8 - ED | Age: 84
Setting detail: OBSERVATION
LOS: 2 days | Discharge: HOME | End: 2019-05-30
Attending: HOSPITALIST | Admitting: HOSPITALIST
Payer: MEDICARE

## 2019-05-28 VITALS — SYSTOLIC BLOOD PRESSURE: 149 MMHG | DIASTOLIC BLOOD PRESSURE: 84 MMHG

## 2019-05-28 VITALS — DIASTOLIC BLOOD PRESSURE: 87 MMHG | SYSTOLIC BLOOD PRESSURE: 152 MMHG

## 2019-05-28 VITALS — DIASTOLIC BLOOD PRESSURE: 69 MMHG | SYSTOLIC BLOOD PRESSURE: 144 MMHG

## 2019-05-28 VITALS — WEIGHT: 144.18 LBS | HEIGHT: 69.3 IN | BODY MASS INDEX: 21.11 KG/M2

## 2019-05-28 DIAGNOSIS — E78.5: ICD-10-CM

## 2019-05-28 DIAGNOSIS — R07.89: Primary | ICD-10-CM

## 2019-05-28 DIAGNOSIS — R00.2: ICD-10-CM

## 2019-05-28 DIAGNOSIS — Z79.899: ICD-10-CM

## 2019-05-28 DIAGNOSIS — M17.11: ICD-10-CM

## 2019-05-28 LAB
ALBUMIN SERPL-MCNC: 4 G/DL (ref 3.4–5)
ALP SERPL-CCNC: 64 U/L (ref 45–117)
ALT SERPL-CCNC: 26 U/L (ref 12–78)
ANION GAP SERPL CALC-SCNC: 7 MMOL/L (ref 5–15)
BASOPHILS # BLD AUTO: 0.03 X10^3/UL (ref 0–0.1)
BASOPHILS NFR BLD AUTO: 0 % (ref 0–1)
BILIRUB SERPL-MCNC: 0.7 MG/DL (ref 0.2–1)
CALCIUM SERPL-MCNC: 8.9 MG/DL (ref 8.5–10.1)
CHLORIDE SERPL-SCNC: 105 MMOL/L (ref 98–107)
CREAT SERPL-MCNC: 0.87 MG/DL (ref 0.55–1.02)
EOSINOPHIL # BLD AUTO: 0.05 X10^3/UL (ref 0–0.4)
EOSINOPHIL NFR BLD AUTO: 1 % (ref 1–7)
ERYTHROCYTE [DISTWIDTH] IN BLOOD BY AUTOMATED COUNT: 14.1 % (ref 9.6–15.2)
LYMPHOCYTES # BLD AUTO: 1.59 X10^3/UL (ref 1–3.4)
LYMPHOCYTES NFR BLD AUTO: 23 % (ref 22–44)
MCH RBC QN AUTO: 31.1 PG (ref 27–34.8)
MCHC RBC AUTO-ENTMCNC: 32 G/DL (ref 32.4–35.8)
MCV RBC AUTO: 97.1 FL (ref 80–100)
MD: NO
MONOCYTES # BLD AUTO: 0.44 X10^3/UL (ref 0.2–0.8)
MONOCYTES NFR BLD AUTO: 6 % (ref 2–9)
NEUTROPHILS # BLD AUTO: 4.69 X10^3/UL (ref 1.8–6.8)
NEUTROPHILS NFR BLD AUTO: 69 % (ref 42–75)
PLATELET # BLD AUTO: 175 X10^3/UL (ref 130–400)
PMV BLD AUTO: 8.1 FL (ref 7.4–10.4)
PROT SERPL-MCNC: 7.4 G/DL (ref 6.4–8.2)
RBC # BLD AUTO: 4.84 X10^6/UL (ref 3.82–5.3)
TROPONIN I SERPL-MCNC: < 0.015 NG/ML (ref 0–0.04)
TSH SERPL-ACNC: 1.61 MIU/L (ref 0.36–3.74)

## 2019-05-28 PROCEDURE — 71045 X-RAY EXAM CHEST 1 VIEW: CPT

## 2019-05-28 PROCEDURE — 80061 LIPID PANEL: CPT

## 2019-05-28 PROCEDURE — 78452 HT MUSCLE IMAGE SPECT MULT: CPT

## 2019-05-28 PROCEDURE — 93306 TTE W/DOPPLER COMPLETE: CPT

## 2019-05-28 PROCEDURE — 85379 FIBRIN DEGRADATION QUANT: CPT

## 2019-05-28 PROCEDURE — 93005 ELECTROCARDIOGRAM TRACING: CPT

## 2019-05-28 PROCEDURE — 84484 ASSAY OF TROPONIN QUANT: CPT

## 2019-05-28 PROCEDURE — 80053 COMPREHEN METABOLIC PANEL: CPT

## 2019-05-28 PROCEDURE — 96372 THER/PROPH/DIAG INJ SC/IM: CPT

## 2019-05-28 PROCEDURE — 85025 COMPLETE CBC W/AUTO DIFF WBC: CPT

## 2019-05-28 PROCEDURE — 84443 ASSAY THYROID STIM HORMONE: CPT

## 2019-05-28 PROCEDURE — 99284 EMERGENCY DEPT VISIT MOD MDM: CPT

## 2019-05-28 PROCEDURE — 36415 COLL VENOUS BLD VENIPUNCTURE: CPT

## 2019-05-28 PROCEDURE — C9898 INPNT STAY RADIOLABELED ITEM: HCPCS

## 2019-05-28 PROCEDURE — 93017 CV STRESS TEST TRACING ONLY: CPT

## 2019-05-28 PROCEDURE — A9502 TC99M TETROFOSMIN: HCPCS

## 2019-05-28 PROCEDURE — G0378 HOSPITAL OBSERVATION PER HR: HCPCS

## 2019-05-28 RX ADMIN — ENOXAPARIN SODIUM SCH MG: 40 INJECTION SUBCUTANEOUS at 14:51

## 2019-05-29 VITALS — SYSTOLIC BLOOD PRESSURE: 138 MMHG | DIASTOLIC BLOOD PRESSURE: 65 MMHG

## 2019-05-29 VITALS — DIASTOLIC BLOOD PRESSURE: 78 MMHG | SYSTOLIC BLOOD PRESSURE: 133 MMHG

## 2019-05-29 VITALS — SYSTOLIC BLOOD PRESSURE: 142 MMHG | DIASTOLIC BLOOD PRESSURE: 78 MMHG

## 2019-05-29 VITALS — DIASTOLIC BLOOD PRESSURE: 73 MMHG | SYSTOLIC BLOOD PRESSURE: 121 MMHG

## 2019-05-29 VITALS — DIASTOLIC BLOOD PRESSURE: 67 MMHG | SYSTOLIC BLOOD PRESSURE: 119 MMHG

## 2019-05-29 LAB
CHOL/HDL RATIO: 2.9
HDL CHOL %: 35 % (ref 28–40)
HDL CHOLESTEROL (DIRECT): 75 MG/DL (ref 40–60)
LDL CHOLESTEROL,CALCULATED: 119 MG/DL (ref 54–169)
LDLC/HDLC SERPL: 1.6 {RATIO} (ref 0.5–3)
TRIGL SERPL-MCNC: 104 MG/DL (ref 50–200)
VLDLC SERPL CALC-MCNC: 21 MG/DL (ref 0–25)

## 2019-05-29 RX ADMIN — ENOXAPARIN SODIUM SCH MG: 40 INJECTION SUBCUTANEOUS at 15:14

## 2019-05-29 RX ADMIN — ASPIRIN SCH MG: 325 TABLET, DELAYED RELEASE ORAL at 05:52

## 2019-05-30 VITALS — SYSTOLIC BLOOD PRESSURE: 95 MMHG | DIASTOLIC BLOOD PRESSURE: 58 MMHG

## 2019-05-30 VITALS — DIASTOLIC BLOOD PRESSURE: 69 MMHG | SYSTOLIC BLOOD PRESSURE: 132 MMHG

## 2019-05-30 VITALS — SYSTOLIC BLOOD PRESSURE: 154 MMHG | DIASTOLIC BLOOD PRESSURE: 80 MMHG

## 2019-05-30 RX ADMIN — ASPIRIN SCH MG: 325 TABLET, DELAYED RELEASE ORAL at 05:28

## 2019-05-30 RX ADMIN — ENOXAPARIN SODIUM SCH MG: 40 INJECTION SUBCUTANEOUS at 14:06

## 2019-06-18 ENCOUNTER — OFFICE VISIT (OUTPATIENT)
Dept: CARDIOLOGY | Facility: MEDICAL CENTER | Age: 84
End: 2019-06-18
Payer: MEDICARE

## 2019-06-18 VITALS
WEIGHT: 145 LBS | BODY MASS INDEX: 21.48 KG/M2 | SYSTOLIC BLOOD PRESSURE: 150 MMHG | OXYGEN SATURATION: 93 % | DIASTOLIC BLOOD PRESSURE: 88 MMHG | HEART RATE: 64 BPM | HEIGHT: 69 IN

## 2019-06-18 DIAGNOSIS — Z86.39 HISTORY OF HYPERCHOLESTEROLEMIA: Chronic | ICD-10-CM

## 2019-06-18 DIAGNOSIS — I45.6 WPW (WOLFF-PARKINSON-WHITE SYNDROME): Chronic | ICD-10-CM

## 2019-06-18 DIAGNOSIS — R07.89 OTHER CHEST PAIN: ICD-10-CM

## 2019-06-18 DIAGNOSIS — R42 DIZZINESS: ICD-10-CM

## 2019-06-18 DIAGNOSIS — I34.0 MITRAL VALVE INSUFFICIENCY, UNSPECIFIED ETIOLOGY: ICD-10-CM

## 2019-06-18 DIAGNOSIS — R00.2 PALPITATIONS: Chronic | ICD-10-CM

## 2019-06-18 PROBLEM — R41.3 MEMORY IMPAIRMENT: Status: ACTIVE | Noted: 2018-11-07

## 2019-06-18 PROBLEM — M19.90 OSTEOARTHROSIS: Status: ACTIVE | Noted: 2018-11-07

## 2019-06-18 PROBLEM — H35.30 MACULAR DEGENERATION: Status: ACTIVE | Noted: 2018-11-07

## 2019-06-18 PROCEDURE — 99214 OFFICE O/P EST MOD 30 MIN: CPT | Performed by: NURSE PRACTITIONER

## 2019-06-18 RX ORDER — MOMETASONE FUROATE 50 UG/1
SPRAY, METERED NASAL
COMMUNITY
Start: 2019-05-08

## 2019-06-18 ASSESSMENT — ENCOUNTER SYMPTOMS
DIZZINESS: 1
PALPITATIONS: 1
LOSS OF CONSCIOUSNESS: 0
INSOMNIA: 0
BRUISES/BLEEDS EASILY: 0
ORTHOPNEA: 0
MYALGIAS: 0
NAUSEA: 0
PND: 0
CHILLS: 0
COUGH: 0
ABDOMINAL PAIN: 0
FEVER: 0
HEADACHES: 0
SHORTNESS OF BREATH: 0

## 2019-06-18 NOTE — PROGRESS NOTES
Chief Complaint   Patient presents with   • Hospital Follow-up   • Chest Pain   • Palpitations   • Yoon-Parkinson-White Syndrome   • Hyperlipidemia       Subjective:   Rosemarie Guajardo is a 83 y.o. female who presents today for hospital follow-up of chest pain.    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 KAUR Moreira.     On 5/28/2019, she woke up with left sided chest pain, which was very intense. She was taken to [facility], and admitted. MPI was negative for ischemia or infarct, with LVEF 70%; echocardiogram showed normal LV function and mild MR/AS. She was discharged home in stable condition.    She is here today for follow-up. She has not had any further episodes of chest pain/pressure, but is quite tired. She does have a lot of dizzy episodes lately, and some episodes of palpitations and skipped beats.  No shortness of breath, orthopnea or PND; no dizziness or syncope; no edema. She does have some ongoing knee pain.    Past Medical History:   Diagnosis Date   • Chest pain 05/2019    MPI with no ischemia or infarct, LVEF 70%   • History of hypercholesterolemia     • Mitral regurgitation 05/2019    Echocardiogram with normal LV size, LVEF 65-70%. Mildly dilated LA. Mild MR, mild AS (Vmax 2.48m/s, peak 24.5mmHg, mean 14mmHg)   • Palpitations     • WPW (Yoon-Parkinson-White syndrome) 1970s    Originally diagnosed in [place], [place].     Past Surgical History:   Procedure Laterality Date   • CATARACT EXTRACTION WITH IOL     • CERVICAL DISK AND FUSION ANTERIOR       Family History   Problem Relation Age of Onset   • Heart Disease Mother         atrial fibrillation   • Lung Disease Father         PNA   • Stroke Father         CVA     Social History     Social History   • Marital status:      Spouse name: N/A   • Number of children: N/A   • Years of  education: N/A     Occupational History   • Not on file.     Social History Main Topics   • Smoking status: Never Smoker   • Smokeless tobacco: Never Used   • Alcohol use 2.5 oz/week     5 Glasses of wine per week      Comment: weekly   • Drug use: No   • Sexual activity: Not on file     Other Topics Concern   • Not on file     Social History Narrative   • No narrative on file     Allergies   Allergen Reactions   • Statins [Hmg-Coa-R Inhibitors]      Intolerance   • Codeine Nausea     BP drops   • Doxycycline Vomiting   • Sulfamethoxazole-Trimethoprim      Other reaction(s): Unknown  Other reaction(s): Unknown     Outpatient Encounter Prescriptions as of 6/18/2019   Medication Sig Dispense Refill   • Magnesium Oxide (MAGOX 400 PO) Take  by mouth.     • ascorbic acid (ASCORBIC ACID) 500 MG Tab Take 500 mg by mouth every day.     • LUTEIN PO Take  by mouth.     • coenzyme Q-10 100 MG Cap capsule Take 1 Cap by mouth every day. 30 Cap 11   • mometasone (NASONEX) 50 MCG/ACT nasal spray      • B Complex-C-Folic Acid (STRESS B COMPLEX PO) Take  by mouth.     • PANTOTHENIC ACID PO Take 75 mg by mouth.     • BIOTIN PO Take  by mouth.     • P-Aminobenzoic Acid (PABA PO) Take  by mouth.     • INOSITOL-5 PO Take  by mouth.     • Non Formulary Request PB8 780mg     • [DISCONTINUED] Non Formulary Request elenthero 125 mg     • Multiple Vitamin (MULTI VITAMIN DAILY PO) Take  by mouth.     • [DISCONTINUED] fluticasone (FLONASE) 50 MCG/ACT nasal spray Spray 2 Sprays in nose as needed.     • Cholecalciferol (VITAMIN D3) 2000 UNITS Tab Take  by mouth.       No facility-administered encounter medications on file as of 6/18/2019.      Review of Systems   Constitutional: Negative for chills and fever.   HENT: Negative for congestion.    Respiratory: Negative for cough and shortness of breath.    Cardiovascular: Positive for palpitations. Negative for chest pain, orthopnea, leg swelling and PND.   Gastrointestinal: Negative for abdominal  "pain and nausea.   Musculoskeletal: Positive for joint pain. Negative for myalgias.   Skin: Negative for rash.   Neurological: Positive for dizziness. Negative for loss of consciousness and headaches.   Endo/Heme/Allergies: Does not bruise/bleed easily.   Psychiatric/Behavioral: The patient does not have insomnia.         Objective:   /88 (BP Location: Left arm, Patient Position: Sitting)   Pulse 64   Ht 1.753 m (5' 9\")   Wt 65.8 kg (145 lb)   SpO2 93%   BMI 21.41 kg/m²     Physical Exam   Constitutional: She is oriented to person, place, and time. She appears well-developed and well-nourished.   HENT:   Head: Normocephalic.   Eyes: EOM are normal.   Neck: Normal range of motion. Neck supple. No JVD present.   Cardiovascular: Normal rate, regular rhythm and normal heart sounds.    Pulmonary/Chest: Effort normal and breath sounds normal. No respiratory distress. She has no wheezes. She has no rales.   Abdominal: Soft. Bowel sounds are normal. She exhibits no distension. There is no tenderness.   Musculoskeletal: Normal range of motion. She exhibits no edema.   Neurological: She is alert and oriented to person, place, and time.   Skin: Skin is warm and dry. No rash noted.   Psychiatric: She has a normal mood and affect.     EKG from 5/28/2019 shows sinus rhythm at 56 bpm    LABS AS OF 5/28/2019:  WBC 6.8  RBC 4.84  Hgb 15.1  Hct 47.0  D-Dimer 0.33  Potassium 3.8  Glucose 110  BUN 15  Creatinine 0.87  AST 24  ALT 26  Cholesterol 215  Triglycerides 104  HDL 75    Cholesterol/HDL ratio 2.9  Troponin <0.015  TSH 1.610    MPI OF 5/30/2019:  No ischemia or infarct, LVEF 70%    CHEST XRAY OF 5/28/2019:  No acute cardiopulmonary abnormality    ECHOCARDIOGRAM OF 5/29/2019:  Normal LV size  LVEF 65-70%  Mildly dilated LA  Mild MR  Mild AS (peak 24.5mmHg, mean 14mmHg, Vmax 2.48m/s)  Mild TR    Assessment:     1. Other chest pain     2. Palpitations  Holter Monitor / Event Recorder   3. WPW " (Yoon-Parkinson-White syndrome)  Holter Monitor / Event Recorder   4. Dizziness  Holter Monitor / Event Recorder   5. History of hypercholesterolemia     6. Mitral valve insufficiency, unspecified etiology         Medical Decision Making:  Today's Assessment / Status / Plan:     1. History of chest pain, with negative workup. Reviewed above test results, and reassured patient.    2. Palpitations, with history of WPW and dizziness, to obtain ZioPatch, and patient is agreeable.    3. Hyperlipidemia, treated with diet alone. Recent lipid panel was good, especially the HDL.    4. Mild MR/AS, stable on echocardiogram.    As above, proceed with ZioPatch. Same medications for now. Follow-up to be determined based on above ZioPatch results.    Collaborating MD: Brandi

## 2019-06-18 NOTE — LETTER
[facility]   [address]   [address]  [place] [address]  Phone: [phone]  Fax: [phone]              Rosemarie Guajardo  1935    Encounter Date: 6/18/2019    DONA Voss          PROGRESS NOTE:  Chief Complaint   Patient presents with   • Hospital Follow-up   • Chest Pain   • Palpitations   • Yoon-Parkinson-White Syndrome   • Hyperlipidemia       Subjective:   Rosemarie Guajardo is a 83 y.o. female who presents today for hospital follow-up of chest pain.    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 KAUR Moreira.     On 5/28/2019, she woke up with left sided chest pain, which was very intense. She was taken to [facility], and admitted. MPI was negative for ischemia or infarct, with LVEF 70%; echocardiogram showed normal LV function and mild MR/AS. She was discharged home in stable condition.    She is here today for follow-up. She has not had any further episodes of chest pain/pressure, but is quite tired. She does have a lot of dizzy episodes lately, and some episodes of palpitations and skipped beats.  No shortness of breath, orthopnea or PND; no dizziness or syncope; no edema. She does have some ongoing knee pain.    Past Medical History:   Diagnosis Date   • Chest pain 05/2019    MPI with no ischemia or infarct, LVEF 70%   • History of hypercholesterolemia     • Mitral regurgitation 05/2019    Echocardiogram with normal LV size, LVEF 65-70%. Mildly dilated LA. Mild MR, mild AS (Vmax 2.48m/s, peak 24.5mmHg, mean 14mmHg)   • Palpitations     • WPW (Yoon-Parkinson-White syndrome) 1970s    Originally diagnosed in [place], [place].     Past Surgical History:   Procedure Laterality Date   • CATARACT EXTRACTION WITH IOL     • CERVICAL DISK AND FUSION ANTERIOR       Family History    Problem Relation Age of Onset   • Heart Disease Mother         atrial fibrillation   • Lung Disease Father         PNA   • Stroke Father         CVA     Social History     Social History   • Marital status:      Spouse name: N/A   • Number of children: N/A   • Years of education: N/A     Occupational History   • Not on file.     Social History Main Topics   • Smoking status: Never Smoker   • Smokeless tobacco: Never Used   • Alcohol use 2.5 oz/week     5 Glasses of wine per week      Comment: weekly   • Drug use: No   • Sexual activity: Not on file     Other Topics Concern   • Not on file     Social History Narrative   • No narrative on file     Allergies   Allergen Reactions   • Statins [Hmg-Coa-R Inhibitors]      Intolerance   • Codeine Nausea     BP drops   • Doxycycline Vomiting   • Sulfamethoxazole-Trimethoprim      Other reaction(s): Unknown  Other reaction(s): Unknown     Outpatient Encounter Prescriptions as of 6/18/2019   Medication Sig Dispense Refill   • Magnesium Oxide (MAGOX 400 PO) Take  by mouth.     • ascorbic acid (ASCORBIC ACID) 500 MG Tab Take 500 mg by mouth every day.     • LUTEIN PO Take  by mouth.     • coenzyme Q-10 100 MG Cap capsule Take 1 Cap by mouth every day. 30 Cap 11   • mometasone (NASONEX) 50 MCG/ACT nasal spray      • B Complex-C-Folic Acid (STRESS B COMPLEX PO) Take  by mouth.     • PANTOTHENIC ACID PO Take 75 mg by mouth.     • BIOTIN PO Take  by mouth.     • P-Aminobenzoic Acid (PABA PO) Take  by mouth.     • INOSITOL-5 PO Take  by mouth.     • Non Formulary Request PB8 780mg     • [DISCONTINUED] Non Formulary Request elenthero 125 mg     • Multiple Vitamin (MULTI VITAMIN DAILY PO) Take  by mouth.     • [DISCONTINUED] fluticasone (FLONASE) 50 MCG/ACT nasal spray Spray 2 Sprays in nose as needed.     • Cholecalciferol (VITAMIN D3) 2000 UNITS Tab Take  by mouth.       No facility-administered encounter medications on file as of 6/18/2019.      Review of Systems    "  Constitutional: Negative for chills and fever.   HENT: Negative for congestion.    Respiratory: Negative for cough and shortness of breath.    Cardiovascular: Positive for palpitations. Negative for chest pain, orthopnea, leg swelling and PND.   Gastrointestinal: Negative for abdominal pain and nausea.   Musculoskeletal: Positive for joint pain. Negative for myalgias.   Skin: Negative for rash.   Neurological: Positive for dizziness. Negative for loss of consciousness and headaches.   Endo/Heme/Allergies: Does not bruise/bleed easily.   Psychiatric/Behavioral: The patient does not have insomnia.         Objective:   /88 (BP Location: Left arm, Patient Position: Sitting)   Pulse 64   Ht 1.753 m (5' 9\")   Wt 65.8 kg (145 lb)   SpO2 93%   BMI 21.41 kg/m²      Physical Exam   Constitutional: She is oriented to person, place, and time. She appears well-developed and well-nourished.   HENT:   Head: Normocephalic.   Eyes: EOM are normal.   Neck: Normal range of motion. Neck supple. No JVD present.   Cardiovascular: Normal rate, regular rhythm and normal heart sounds.    Pulmonary/Chest: Effort normal and breath sounds normal. No respiratory distress. She has no wheezes. She has no rales.   Abdominal: Soft. Bowel sounds are normal. She exhibits no distension. There is no tenderness.   Musculoskeletal: Normal range of motion. She exhibits no edema.   Neurological: She is alert and oriented to person, place, and time.   Skin: Skin is warm and dry. No rash noted.   Psychiatric: She has a normal mood and affect.     EKG from 5/28/2019 shows sinus rhythm at 56 bpm    LABS AS OF 5/28/2019:  WBC 6.8  RBC 4.84  Hgb 15.1  Hct 47.0  D-Dimer 0.33  Potassium 3.8  Glucose 110  BUN 15  Creatinine 0.87  AST 24  ALT 26  Cholesterol 215  Triglycerides 104  HDL 75    Cholesterol/HDL ratio 2.9  Troponin <0.015  TSH 1.610    MPI OF 5/30/2019:  No ischemia or infarct, LVEF 70%    CHEST XRAY OF 5/28/2019:  No acute " cardiopulmonary abnormality    ECHOCARDIOGRAM OF 5/29/2019:  Normal LV size  LVEF 65-70%  Mildly dilated LA  Mild MR  Mild AS (peak 24.5mmHg, mean 14mmHg, Vmax 2.48m/s)  Mild TR    Assessment:     1. Other chest pain     2. Palpitations  Holter Monitor / Event Recorder   3. WPW (Yoon-Parkinson-White syndrome)  Holter Monitor / Event Recorder   4. Dizziness  Holter Monitor / Event Recorder   5. History of hypercholesterolemia     6. Mitral valve insufficiency, unspecified etiology         Medical Decision Making:  Today's Assessment / Status / Plan:     1. History of chest pain, with negative workup. Reviewed above test results, and reassured patient.    2. Palpitations, with history of WPW and dizziness, to obtain ZioPatch, and patient is agreeable.    3. Hyperlipidemia, treated with diet alone. Recent lipid panel was good, especially the HDL.    4. Mild MR/AS, stable on echocardiogram.    As above, proceed with ZioPatch. Same medications for now. Follow-up to be determined based on above ZioPatch results.    Collaborating MD: Brandi Christopher

## 2019-07-16 ENCOUNTER — TELEPHONE (OUTPATIENT)
Dept: CARDIOLOGY | Facility: MEDICAL CENTER | Age: 84
End: 2019-07-16

## 2019-07-16 NOTE — TELEPHONE ENCOUNTER
----- Message from Loren Frank sent at 7/16/2019 11:29 AM PDT -----  Regarding: inform she is canceling Zio Patch appt on 7/19 as she is still uncertain  Contact: [phone]  AB/Sunita Larios is calling to inform she is canceling Zio Patch appt on 7/19 as she is still uncertain about it. She would please like a call back at [phone] to discuss. If unable to reach at home # please call cell# [phone].

## 2019-07-16 NOTE — TELEPHONE ENCOUNTER
TC to pt. to discuss. I answered her questions about the Ziopatch. She is now willing to do it. I'll have schedulers call her to reschedule.   To [facility] Schedulers Pool

## 2019-11-08 ENCOUNTER — HOSPITAL ENCOUNTER (INPATIENT)
Dept: HOSPITAL 8 - ORIP | Age: 84
LOS: 10 days | DRG: 466 | End: 2019-11-18
Attending: HOSPITALIST | Admitting: ORTHOPAEDIC SURGERY
Payer: MEDICARE

## 2019-11-08 VITALS — DIASTOLIC BLOOD PRESSURE: 74 MMHG | SYSTOLIC BLOOD PRESSURE: 125 MMHG

## 2019-11-08 VITALS — DIASTOLIC BLOOD PRESSURE: 72 MMHG | SYSTOLIC BLOOD PRESSURE: 149 MMHG

## 2019-11-08 VITALS — BODY MASS INDEX: 18.45 KG/M2 | HEIGHT: 69 IN | WEIGHT: 124.56 LBS
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
## 2019-11-08 DIAGNOSIS — Z88.5: ICD-10-CM
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
## 2019-11-08 DIAGNOSIS — M96.89: ICD-10-CM

## 2019-11-08 DIAGNOSIS — I97.711: ICD-10-CM

## 2019-11-08 DIAGNOSIS — T84.020A: ICD-10-CM
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
## 2019-11-08 DIAGNOSIS — R53.2: ICD-10-CM

## 2019-11-08 DIAGNOSIS — D62: ICD-10-CM

## 2019-11-08 DIAGNOSIS — Z91.81: ICD-10-CM

## 2019-11-08 DIAGNOSIS — I26.99: ICD-10-CM

## 2019-11-08 DIAGNOSIS — J44.9: ICD-10-CM

## 2019-11-08 DIAGNOSIS — T84.194A: Primary | ICD-10-CM

## 2019-11-08 DIAGNOSIS — Z88.8: ICD-10-CM
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
## 2019-11-08 DIAGNOSIS — F03.91: ICD-10-CM

## 2019-11-08 DIAGNOSIS — I50.32: ICD-10-CM

## 2019-11-08 PROCEDURE — 81001 URINALYSIS AUTO W/SCOPE: CPT

## 2019-11-08 PROCEDURE — 93005 ELECTROCARDIOGRAM TRACING: CPT

## 2019-11-08 PROCEDURE — 86850 RBC ANTIBODY SCREEN: CPT

## 2019-11-08 PROCEDURE — 82607 VITAMIN B-12: CPT

## 2019-11-08 PROCEDURE — 82330 ASSAY OF CALCIUM: CPT

## 2019-11-08 PROCEDURE — 80053 COMPREHEN METABOLIC PANEL: CPT

## 2019-11-08 PROCEDURE — 82565 ASSAY OF CREATININE: CPT

## 2019-11-08 PROCEDURE — 85014 HEMATOCRIT: CPT

## 2019-11-08 PROCEDURE — C1713 ANCHOR/SCREW BN/BN,TIS/BN: HCPCS

## 2019-11-08 PROCEDURE — 83735 ASSAY OF MAGNESIUM: CPT

## 2019-11-08 PROCEDURE — 85610 PROTHROMBIN TIME: CPT

## 2019-11-08 PROCEDURE — 84100 ASSAY OF PHOSPHORUS: CPT

## 2019-11-08 PROCEDURE — 0SPR0JZ REMOVAL OF SYNTHETIC SUBSTITUTE FROM RIGHT HIP JOINT, FEMORAL SURFACE, OPEN APPROACH: ICD-10-PCS | Performed by: ORTHOPAEDIC SURGERY

## 2019-11-08 PROCEDURE — 84132 ASSAY OF SERUM POTASSIUM: CPT

## 2019-11-08 PROCEDURE — 70450 CT HEAD/BRAIN W/O DYE: CPT

## 2019-11-08 PROCEDURE — 85025 COMPLETE CBC W/AUTO DIFF WBC: CPT

## 2019-11-08 PROCEDURE — 36415 COLL VENOUS BLD VENIPUNCTURE: CPT

## 2019-11-08 PROCEDURE — 84295 ASSAY OF SERUM SODIUM: CPT

## 2019-11-08 PROCEDURE — 82803 BLOOD GASES ANY COMBINATION: CPT

## 2019-11-08 PROCEDURE — 0SRR0J9 REPLACEMENT OF RIGHT HIP JOINT, FEMORAL SURFACE WITH SYNTHETIC SUBSTITUTE, CEMENTED, OPEN APPROACH: ICD-10-PCS | Performed by: ORTHOPAEDIC SURGERY

## 2019-11-08 PROCEDURE — 86900 BLOOD TYPING SEROLOGIC ABO: CPT

## 2019-11-08 PROCEDURE — 71045 X-RAY EXAM CHEST 1 VIEW: CPT

## 2019-11-08 PROCEDURE — 82306 VITAMIN D 25 HYDROXY: CPT

## 2019-11-08 PROCEDURE — 82140 ASSAY OF AMMONIA: CPT

## 2019-11-08 PROCEDURE — 82947 ASSAY GLUCOSE BLOOD QUANT: CPT

## 2019-11-08 PROCEDURE — 84443 ASSAY THYROID STIM HORMONE: CPT

## 2019-11-08 PROCEDURE — C1776 JOINT DEVICE (IMPLANTABLE): HCPCS

## 2019-11-08 PROCEDURE — 72170 X-RAY EXAM OF PELVIS: CPT

## 2019-11-08 PROCEDURE — C1762 CONN TISS, HUMAN(INC FASCIA): HCPCS

## 2019-11-08 PROCEDURE — 93312 ECHO TRANSESOPHAGEAL: CPT

## 2019-11-08 PROCEDURE — 84145 PROCALCITONIN (PCT): CPT

## 2019-11-08 PROCEDURE — 85730 THROMBOPLASTIN TIME PARTIAL: CPT

## 2019-11-08 RX ADMIN — SODIUM CHLORIDE, SODIUM LACTATE, POTASSIUM CHLORIDE, AND CALCIUM CHLORIDE SCH MLS/HR: .6; .31; .03; .02 INJECTION, SOLUTION INTRAVENOUS at 10:08

## 2019-11-08 RX ADMIN — SODIUM CHLORIDE, PRESERVATIVE FREE SCH ML: 5 INJECTION INTRAVENOUS at 21:00

## 2019-11-08 RX ADMIN — KETOROLAC TROMETHAMINE SCH MG: 30 INJECTION, SOLUTION INTRAMUSCULAR at 18:30

## 2019-11-08 RX ADMIN — CEFAZOLIN SODIUM SCH MLS/HR: 1 SOLUTION INTRAVENOUS at 18:29

## 2019-11-09 VITALS — SYSTOLIC BLOOD PRESSURE: 109 MMHG | DIASTOLIC BLOOD PRESSURE: 65 MMHG

## 2019-11-09 VITALS — SYSTOLIC BLOOD PRESSURE: 107 MMHG | DIASTOLIC BLOOD PRESSURE: 60 MMHG

## 2019-11-09 VITALS — SYSTOLIC BLOOD PRESSURE: 130 MMHG | DIASTOLIC BLOOD PRESSURE: 70 MMHG

## 2019-11-09 VITALS — DIASTOLIC BLOOD PRESSURE: 63 MMHG | SYSTOLIC BLOOD PRESSURE: 108 MMHG

## 2019-11-09 RX ADMIN — SODIUM CHLORIDE, SODIUM LACTATE, POTASSIUM CHLORIDE, AND CALCIUM CHLORIDE SCH MLS/HR: .6; .31; .03; .02 INJECTION, SOLUTION INTRAVENOUS at 05:06

## 2019-11-09 RX ADMIN — HYDROMORPHONE HYDROCHLORIDE PRN MG: 2 INJECTION INTRAMUSCULAR; INTRAVENOUS; SUBCUTANEOUS at 22:31

## 2019-11-09 RX ADMIN — ENOXAPARIN SODIUM SCH MG: 40 INJECTION SUBCUTANEOUS at 08:41

## 2019-11-09 RX ADMIN — CEFAZOLIN SODIUM SCH MLS/HR: 1 SOLUTION INTRAVENOUS at 02:47

## 2019-11-09 RX ADMIN — KETOROLAC TROMETHAMINE SCH MG: 30 INJECTION, SOLUTION INTRAMUSCULAR at 11:44

## 2019-11-09 RX ADMIN — ENOXAPARIN SODIUM SCH MG: 40 INJECTION SUBCUTANEOUS at 09:00

## 2019-11-09 RX ADMIN — SODIUM CHLORIDE, PRESERVATIVE FREE SCH ML: 5 INJECTION INTRAVENOUS at 08:40

## 2019-11-09 RX ADMIN — QUETIAPINE FUMARATE SCH MG: 25 TABLET ORAL at 20:29

## 2019-11-09 RX ADMIN — SODIUM CHLORIDE, PRESERVATIVE FREE SCH ML: 5 INJECTION INTRAVENOUS at 18:54

## 2019-11-09 RX ADMIN — KETOROLAC TROMETHAMINE SCH MG: 30 INJECTION, SOLUTION INTRAMUSCULAR at 03:18

## 2019-11-10 VITALS — SYSTOLIC BLOOD PRESSURE: 138 MMHG | DIASTOLIC BLOOD PRESSURE: 75 MMHG

## 2019-11-10 VITALS — SYSTOLIC BLOOD PRESSURE: 113 MMHG | DIASTOLIC BLOOD PRESSURE: 68 MMHG

## 2019-11-10 VITALS — SYSTOLIC BLOOD PRESSURE: 117 MMHG | DIASTOLIC BLOOD PRESSURE: 66 MMHG

## 2019-11-10 VITALS — DIASTOLIC BLOOD PRESSURE: 72 MMHG | SYSTOLIC BLOOD PRESSURE: 129 MMHG

## 2019-11-10 LAB — CREAT SERPL-MCNC: 0.57 MG/DL (ref 0.55–1.02)

## 2019-11-10 RX ADMIN — HYDROMORPHONE HYDROCHLORIDE PRN MG: 2 INJECTION INTRAMUSCULAR; INTRAVENOUS; SUBCUTANEOUS at 14:31

## 2019-11-10 RX ADMIN — SODIUM CHLORIDE, PRESERVATIVE FREE SCH ML: 5 INJECTION INTRAVENOUS at 08:50

## 2019-11-10 RX ADMIN — HYDROMORPHONE HYDROCHLORIDE PRN MG: 2 INJECTION INTRAMUSCULAR; INTRAVENOUS; SUBCUTANEOUS at 09:10

## 2019-11-10 RX ADMIN — SODIUM CHLORIDE, SODIUM LACTATE, POTASSIUM CHLORIDE, AND CALCIUM CHLORIDE SCH MLS/HR: .6; .31; .03; .02 INJECTION, SOLUTION INTRAVENOUS at 02:04

## 2019-11-10 RX ADMIN — QUETIAPINE FUMARATE SCH MG: 25 TABLET ORAL at 19:18

## 2019-11-10 RX ADMIN — ENOXAPARIN SODIUM SCH MG: 40 INJECTION SUBCUTANEOUS at 08:49

## 2019-11-10 RX ADMIN — SODIUM CHLORIDE, SODIUM LACTATE, POTASSIUM CHLORIDE, AND CALCIUM CHLORIDE SCH MLS/HR: .6; .31; .03; .02 INJECTION, SOLUTION INTRAVENOUS at 22:00

## 2019-11-10 RX ADMIN — SODIUM CHLORIDE, PRESERVATIVE FREE SCH ML: 5 INJECTION INTRAVENOUS at 19:18

## 2019-11-11 VITALS — DIASTOLIC BLOOD PRESSURE: 51 MMHG | SYSTOLIC BLOOD PRESSURE: 82 MMHG

## 2019-11-11 VITALS — DIASTOLIC BLOOD PRESSURE: 59 MMHG | SYSTOLIC BLOOD PRESSURE: 90 MMHG

## 2019-11-11 VITALS — SYSTOLIC BLOOD PRESSURE: 126 MMHG | DIASTOLIC BLOOD PRESSURE: 74 MMHG

## 2019-11-11 VITALS — SYSTOLIC BLOOD PRESSURE: 152 MMHG | DIASTOLIC BLOOD PRESSURE: 73 MMHG

## 2019-11-11 RX ADMIN — SODIUM CHLORIDE, PRESERVATIVE FREE SCH ML: 5 INJECTION INTRAVENOUS at 21:00

## 2019-11-11 RX ADMIN — SODIUM CHLORIDE, SODIUM LACTATE, POTASSIUM CHLORIDE, AND CALCIUM CHLORIDE SCH MLS/HR: .6; .31; .03; .02 INJECTION, SOLUTION INTRAVENOUS at 18:00

## 2019-11-11 RX ADMIN — QUETIAPINE FUMARATE SCH MG: 25 TABLET ORAL at 21:34

## 2019-11-11 RX ADMIN — SODIUM CHLORIDE, PRESERVATIVE FREE SCH ML: 5 INJECTION INTRAVENOUS at 09:00

## 2019-11-12 VITALS — DIASTOLIC BLOOD PRESSURE: 79 MMHG | SYSTOLIC BLOOD PRESSURE: 145 MMHG

## 2019-11-12 VITALS — SYSTOLIC BLOOD PRESSURE: 103 MMHG | DIASTOLIC BLOOD PRESSURE: 64 MMHG

## 2019-11-12 VITALS — DIASTOLIC BLOOD PRESSURE: 56 MMHG | SYSTOLIC BLOOD PRESSURE: 123 MMHG

## 2019-11-12 RX ADMIN — DOCUSATE SODIUM SCH MG: 100 CAPSULE, LIQUID FILLED ORAL at 20:10

## 2019-11-12 RX ADMIN — SODIUM CHLORIDE, SODIUM LACTATE, POTASSIUM CHLORIDE, AND CALCIUM CHLORIDE SCH MLS/HR: .6; .31; .03; .02 INJECTION, SOLUTION INTRAVENOUS at 10:05

## 2019-11-12 RX ADMIN — QUETIAPINE FUMARATE SCH MG: 25 TABLET ORAL at 20:10

## 2019-11-12 RX ADMIN — HYDROCODONE BITARTRATE AND ACETAMINOPHEN PRN ML: 7.5; 325 SOLUTION ORAL at 14:48

## 2019-11-12 RX ADMIN — SODIUM CHLORIDE, PRESERVATIVE FREE SCH ML: 5 INJECTION INTRAVENOUS at 08:53

## 2019-11-12 RX ADMIN — SODIUM CHLORIDE, PRESERVATIVE FREE SCH ML: 5 INJECTION INTRAVENOUS at 21:00

## 2019-11-13 VITALS — SYSTOLIC BLOOD PRESSURE: 151 MMHG | DIASTOLIC BLOOD PRESSURE: 81 MMHG

## 2019-11-13 VITALS — SYSTOLIC BLOOD PRESSURE: 132 MMHG | DIASTOLIC BLOOD PRESSURE: 63 MMHG

## 2019-11-13 VITALS — SYSTOLIC BLOOD PRESSURE: 134 MMHG | DIASTOLIC BLOOD PRESSURE: 72 MMHG

## 2019-11-13 VITALS — DIASTOLIC BLOOD PRESSURE: 71 MMHG | SYSTOLIC BLOOD PRESSURE: 130 MMHG

## 2019-11-13 LAB
ALBUMIN SERPL-MCNC: 2.6 G/DL (ref 3.4–5)
ALP SERPL-CCNC: 80 U/L (ref 45–117)
ALT SERPL-CCNC: 40 U/L (ref 12–78)
ANION GAP SERPL CALC-SCNC: 7 MMOL/L (ref 5–15)
BASOPHILS # BLD AUTO: 0.01 X10^3/UL (ref 0–0.1)
BASOPHILS NFR BLD AUTO: 0 % (ref 0–1)
BILIRUB SERPL-MCNC: 0.7 MG/DL (ref 0.2–1)
CALCIUM SERPL-MCNC: 8.6 MG/DL (ref 8.5–10.1)
CHLORIDE SERPL-SCNC: 105 MMOL/L (ref 98–107)
CREAT SERPL-MCNC: 0.6 MG/DL (ref 0.55–1.02)
CREAT SERPL-MCNC: 0.68 MG/DL (ref 0.55–1.02)
EOSINOPHIL # BLD AUTO: 0.07 X10^3/UL (ref 0–0.4)
EOSINOPHIL NFR BLD AUTO: 1 % (ref 1–7)
ERYTHROCYTE [DISTWIDTH] IN BLOOD BY AUTOMATED COUNT: 13.6 % (ref 9.6–15.2)
LYMPHOCYTES # BLD AUTO: 1.37 X10^3/UL (ref 1–3.4)
LYMPHOCYTES NFR BLD AUTO: 14 % (ref 22–44)
MCH RBC QN AUTO: 30.3 PG (ref 27–34.8)
MCHC RBC AUTO-ENTMCNC: 32.3 G/DL (ref 32.4–35.8)
MCV RBC AUTO: 94 FL (ref 80–100)
MD: NO
MONOCYTES # BLD AUTO: 0.52 X10^3/UL (ref 0.2–0.8)
MONOCYTES NFR BLD AUTO: 6 % (ref 2–9)
NEUTROPHILS # BLD AUTO: 7.51 X10^3/UL (ref 1.8–6.8)
NEUTROPHILS NFR BLD AUTO: 79 % (ref 42–75)
PLATELET # BLD AUTO: 312 X10^3/UL (ref 130–400)
PMV BLD AUTO: 7.1 FL (ref 7.4–10.4)
PROT SERPL-MCNC: 6.4 G/DL (ref 6.4–8.2)
RBC # BLD AUTO: 4.19 X10^6/UL (ref 3.82–5.3)

## 2019-11-13 RX ADMIN — DOCUSATE SODIUM SCH MG: 100 CAPSULE, LIQUID FILLED ORAL at 20:53

## 2019-11-13 RX ADMIN — HYDROCODONE BITARTRATE AND ACETAMINOPHEN PRN ML: 7.5; 325 SOLUTION ORAL at 10:27

## 2019-11-13 RX ADMIN — SODIUM CHLORIDE, PRESERVATIVE FREE SCH ML: 5 INJECTION INTRAVENOUS at 20:53

## 2019-11-13 RX ADMIN — HYDROCODONE BITARTRATE AND ACETAMINOPHEN PRN ML: 7.5; 325 SOLUTION ORAL at 08:46

## 2019-11-13 RX ADMIN — HYDROCODONE BITARTRATE AND ACETAMINOPHEN PRN ML: 7.5; 325 SOLUTION ORAL at 00:46

## 2019-11-13 RX ADMIN — SODIUM CHLORIDE, SODIUM LACTATE, POTASSIUM CHLORIDE, AND CALCIUM CHLORIDE SCH MLS/HR: .6; .31; .03; .02 INJECTION, SOLUTION INTRAVENOUS at 08:07

## 2019-11-13 RX ADMIN — QUETIAPINE FUMARATE SCH MG: 25 TABLET ORAL at 20:53

## 2019-11-13 RX ADMIN — DOCUSATE SODIUM SCH MG: 100 CAPSULE, LIQUID FILLED ORAL at 08:46

## 2019-11-13 RX ADMIN — ASPIRIN 81 MG SCH MG: 81 TABLET ORAL at 08:46

## 2019-11-13 RX ADMIN — ASPIRIN 81 MG SCH MG: 81 TABLET ORAL at 20:53

## 2019-11-13 RX ADMIN — HYDROCODONE BITARTRATE AND ACETAMINOPHEN PRN ML: 7.5; 325 SOLUTION ORAL at 10:26

## 2019-11-13 RX ADMIN — SODIUM CHLORIDE, PRESERVATIVE FREE SCH ML: 5 INJECTION INTRAVENOUS at 08:08

## 2019-11-13 RX ADMIN — BISACODYL PRN MG: 10 SUPPOSITORY RECTAL at 15:20

## 2019-11-14 VITALS — DIASTOLIC BLOOD PRESSURE: 78 MMHG | SYSTOLIC BLOOD PRESSURE: 122 MMHG

## 2019-11-14 VITALS — DIASTOLIC BLOOD PRESSURE: 67 MMHG | SYSTOLIC BLOOD PRESSURE: 118 MMHG

## 2019-11-14 VITALS — DIASTOLIC BLOOD PRESSURE: 83 MMHG | SYSTOLIC BLOOD PRESSURE: 146 MMHG

## 2019-11-14 VITALS — DIASTOLIC BLOOD PRESSURE: 60 MMHG | SYSTOLIC BLOOD PRESSURE: 105 MMHG

## 2019-11-14 VITALS — DIASTOLIC BLOOD PRESSURE: 51 MMHG | SYSTOLIC BLOOD PRESSURE: 92 MMHG

## 2019-11-14 VITALS — DIASTOLIC BLOOD PRESSURE: 87 MMHG | SYSTOLIC BLOOD PRESSURE: 142 MMHG

## 2019-11-14 VITALS — SYSTOLIC BLOOD PRESSURE: 123 MMHG | DIASTOLIC BLOOD PRESSURE: 67 MMHG

## 2019-11-14 VITALS — DIASTOLIC BLOOD PRESSURE: 66 MMHG | SYSTOLIC BLOOD PRESSURE: 101 MMHG

## 2019-11-14 VITALS — SYSTOLIC BLOOD PRESSURE: 138 MMHG | DIASTOLIC BLOOD PRESSURE: 82 MMHG

## 2019-11-14 LAB
CULTURE INDICATED?: NO
MICROSCOPIC: (no result)

## 2019-11-14 RX ADMIN — ASPIRIN 81 MG SCH MG: 81 TABLET ORAL at 20:35

## 2019-11-14 RX ADMIN — SODIUM CHLORIDE, PRESERVATIVE FREE SCH ML: 5 INJECTION INTRAVENOUS at 20:36

## 2019-11-14 RX ADMIN — SODIUM CHLORIDE, PRESERVATIVE FREE SCH ML: 5 INJECTION INTRAVENOUS at 08:31

## 2019-11-14 RX ADMIN — DOCUSATE SODIUM SCH MG: 50 LIQUID ORAL at 20:34

## 2019-11-14 RX ADMIN — DOCUSATE SODIUM SCH MG: 100 CAPSULE, LIQUID FILLED ORAL at 08:32

## 2019-11-14 RX ADMIN — QUETIAPINE FUMARATE SCH MG: 25 TABLET ORAL at 20:35

## 2019-11-14 RX ADMIN — SODIUM CHLORIDE, SODIUM LACTATE, POTASSIUM CHLORIDE, AND CALCIUM CHLORIDE SCH MLS/HR: .6; .31; .03; .02 INJECTION, SOLUTION INTRAVENOUS at 06:00

## 2019-11-14 RX ADMIN — ASPIRIN 81 MG SCH MG: 81 TABLET ORAL at 08:32

## 2019-11-15 VITALS — SYSTOLIC BLOOD PRESSURE: 147 MMHG | DIASTOLIC BLOOD PRESSURE: 76 MMHG

## 2019-11-15 VITALS — DIASTOLIC BLOOD PRESSURE: 84 MMHG | SYSTOLIC BLOOD PRESSURE: 142 MMHG

## 2019-11-15 VITALS — DIASTOLIC BLOOD PRESSURE: 74 MMHG | SYSTOLIC BLOOD PRESSURE: 128 MMHG

## 2019-11-15 VITALS — DIASTOLIC BLOOD PRESSURE: 63 MMHG | SYSTOLIC BLOOD PRESSURE: 106 MMHG

## 2019-11-15 RX ADMIN — SODIUM CHLORIDE, PRESERVATIVE FREE SCH ML: 5 INJECTION INTRAVENOUS at 20:18

## 2019-11-15 RX ADMIN — DOCUSATE SODIUM SCH MG: 50 LIQUID ORAL at 20:18

## 2019-11-15 RX ADMIN — ASPIRIN 81 MG SCH MG: 81 TABLET ORAL at 09:17

## 2019-11-15 RX ADMIN — ACETAMINOPHEN PRN MG: 325 TABLET, FILM COATED ORAL at 13:37

## 2019-11-15 RX ADMIN — Medication SCH MG: at 20:18

## 2019-11-15 RX ADMIN — SODIUM CHLORIDE, PRESERVATIVE FREE SCH ML: 5 INJECTION INTRAVENOUS at 09:00

## 2019-11-15 RX ADMIN — DOCUSATE SODIUM SCH MG: 50 LIQUID ORAL at 09:18

## 2019-11-15 RX ADMIN — ASPIRIN 81 MG SCH MG: 81 TABLET ORAL at 20:18

## 2019-11-15 RX ADMIN — SODIUM CHLORIDE, SODIUM LACTATE, POTASSIUM CHLORIDE, AND CALCIUM CHLORIDE SCH MLS/HR: .6; .31; .03; .02 INJECTION, SOLUTION INTRAVENOUS at 20:18

## 2019-11-15 RX ADMIN — SODIUM CHLORIDE, SODIUM LACTATE, POTASSIUM CHLORIDE, AND CALCIUM CHLORIDE SCH MLS/HR: .6; .31; .03; .02 INJECTION, SOLUTION INTRAVENOUS at 02:00

## 2019-11-15 RX ADMIN — QUETIAPINE FUMARATE SCH MG: 25 TABLET ORAL at 20:18

## 2019-11-16 VITALS — DIASTOLIC BLOOD PRESSURE: 75 MMHG | SYSTOLIC BLOOD PRESSURE: 129 MMHG

## 2019-11-16 VITALS — SYSTOLIC BLOOD PRESSURE: 101 MMHG | DIASTOLIC BLOOD PRESSURE: 53 MMHG

## 2019-11-16 VITALS — DIASTOLIC BLOOD PRESSURE: 70 MMHG | SYSTOLIC BLOOD PRESSURE: 140 MMHG

## 2019-11-16 VITALS — SYSTOLIC BLOOD PRESSURE: 108 MMHG | DIASTOLIC BLOOD PRESSURE: 66 MMHG

## 2019-11-16 LAB — CREAT SERPL-MCNC: 0.61 MG/DL (ref 0.55–1.02)

## 2019-11-16 RX ADMIN — SODIUM CHLORIDE, SODIUM LACTATE, POTASSIUM CHLORIDE, AND CALCIUM CHLORIDE SCH MLS/HR: .6; .31; .03; .02 INJECTION, SOLUTION INTRAVENOUS at 18:00

## 2019-11-16 RX ADMIN — ASPIRIN 81 MG SCH MG: 81 TABLET ORAL at 20:20

## 2019-11-16 RX ADMIN — ASPIRIN 81 MG SCH MG: 81 TABLET ORAL at 09:58

## 2019-11-16 RX ADMIN — DOCUSATE SODIUM SCH MG: 50 LIQUID ORAL at 20:19

## 2019-11-16 RX ADMIN — DOCUSATE SODIUM SCH MG: 50 LIQUID ORAL at 09:58

## 2019-11-16 RX ADMIN — QUETIAPINE FUMARATE SCH MG: 25 TABLET ORAL at 20:19

## 2019-11-16 RX ADMIN — Medication SCH MG: at 20:20

## 2019-11-16 RX ADMIN — SODIUM CHLORIDE, PRESERVATIVE FREE SCH ML: 5 INJECTION INTRAVENOUS at 09:59

## 2019-11-16 RX ADMIN — SODIUM CHLORIDE, PRESERVATIVE FREE SCH ML: 5 INJECTION INTRAVENOUS at 20:21

## 2019-11-17 VITALS — SYSTOLIC BLOOD PRESSURE: 96 MMHG | DIASTOLIC BLOOD PRESSURE: 59 MMHG

## 2019-11-17 VITALS — SYSTOLIC BLOOD PRESSURE: 110 MMHG | DIASTOLIC BLOOD PRESSURE: 58 MMHG

## 2019-11-17 VITALS — DIASTOLIC BLOOD PRESSURE: 56 MMHG | SYSTOLIC BLOOD PRESSURE: 105 MMHG

## 2019-11-17 LAB
BASOPHILS # BLD AUTO: 0.02 X10^3/UL (ref 0–0.1)
BASOPHILS NFR BLD AUTO: 0 % (ref 0–1)
EOSINOPHIL # BLD AUTO: 0.03 X10^3/UL (ref 0–0.4)
EOSINOPHIL NFR BLD AUTO: 0 % (ref 1–7)
ERYTHROCYTE [DISTWIDTH] IN BLOOD BY AUTOMATED COUNT: 13.6 % (ref 9.6–15.2)
LYMPHOCYTES # BLD AUTO: 0.62 X10^3/UL (ref 1–3.4)
LYMPHOCYTES NFR BLD AUTO: 5 % (ref 22–44)
MCH RBC QN AUTO: 30.4 PG (ref 27–34.8)
MCHC RBC AUTO-ENTMCNC: 32.2 G/DL (ref 32.4–35.8)
MCV RBC AUTO: 94.4 FL (ref 80–100)
MD: (no result)
MONOCYTES # BLD AUTO: 0.31 X10^3/UL (ref 0.2–0.8)
MONOCYTES NFR BLD AUTO: 2 % (ref 2–9)
NEUTROPHILS # BLD AUTO: 12.66 X10^3/UL (ref 1.8–6.8)
NEUTROPHILS NFR BLD AUTO: 93 % (ref 42–75)
PLATELET # BLD AUTO: 293 X10^3/UL (ref 130–400)
PMV BLD AUTO: 6.8 FL (ref 7.4–10.4)
RBC # BLD AUTO: 3.47 X10^6/UL (ref 3.82–5.3)

## 2019-11-17 RX ADMIN — Medication SCH MG: at 20:14

## 2019-11-17 RX ADMIN — SODIUM CHLORIDE, SODIUM LACTATE, POTASSIUM CHLORIDE, AND CALCIUM CHLORIDE SCH MLS/HR: .6; .31; .03; .02 INJECTION, SOLUTION INTRAVENOUS at 07:21

## 2019-11-17 RX ADMIN — ACETAMINOPHEN PRN MG: 325 TABLET, FILM COATED ORAL at 09:48

## 2019-11-17 RX ADMIN — ASPIRIN 81 MG SCH MG: 81 TABLET ORAL at 08:34

## 2019-11-17 RX ADMIN — DOCUSATE SODIUM SCH MG: 50 LIQUID ORAL at 09:47

## 2019-11-17 RX ADMIN — SODIUM CHLORIDE, PRESERVATIVE FREE SCH ML: 5 INJECTION INTRAVENOUS at 20:15

## 2019-11-17 RX ADMIN — SODIUM CHLORIDE, PRESERVATIVE FREE SCH ML: 5 INJECTION INTRAVENOUS at 09:00

## 2019-11-17 RX ADMIN — BISACODYL PRN MG: 10 SUPPOSITORY RECTAL at 06:01

## 2019-11-17 RX ADMIN — QUETIAPINE FUMARATE SCH MG: 25 TABLET ORAL at 20:14

## 2019-11-17 RX ADMIN — DOCUSATE SODIUM SCH MG: 50 LIQUID ORAL at 20:14

## 2019-11-17 RX ADMIN — HYDROCODONE BITARTRATE AND ACETAMINOPHEN PRN ML: 7.5; 325 SOLUTION ORAL at 03:28

## 2019-11-18 VITALS — SYSTOLIC BLOOD PRESSURE: 103 MMHG | DIASTOLIC BLOOD PRESSURE: 58 MMHG

## 2019-11-18 VITALS — DIASTOLIC BLOOD PRESSURE: 65 MMHG | SYSTOLIC BLOOD PRESSURE: 107 MMHG

## 2019-11-18 LAB
ALBUMIN SERPL-MCNC: 2.6 G/DL (ref 3.4–5)
ALP SERPL-CCNC: 81 U/L (ref 45–117)
ALT SERPL-CCNC: 34 U/L (ref 12–78)
ANION GAP SERPL CALC-SCNC: 7 MMOL/L (ref 5–15)
APTT BLD: 26 SECONDS (ref 25–31)
BASOPHILS # BLD AUTO: 0.06 X10^3/UL (ref 0–0.1)
BASOPHILS NFR BLD AUTO: 0 % (ref 0–1)
BILIRUB SERPL-MCNC: 0.8 MG/DL (ref 0.2–1)
CALCIUM SERPL-MCNC: 8.3 MG/DL (ref 8.5–10.1)
CHLORIDE SERPL-SCNC: 106 MMOL/L (ref 98–107)
CREAT SERPL-MCNC: 0.76 MG/DL (ref 0.55–1.02)
EOSINOPHIL # BLD AUTO: 0.04 X10^3/UL (ref 0–0.4)
EOSINOPHIL NFR BLD AUTO: 0 % (ref 1–7)
ERYTHROCYTE [DISTWIDTH] IN BLOOD BY AUTOMATED COUNT: 13.6 % (ref 9.6–15.2)
INR PPP: 1.02 (ref 0.93–1.1)
LYMPHOCYTES # BLD AUTO: 1.05 X10^3/UL (ref 1–3.4)
LYMPHOCYTES NFR BLD AUTO: 6 % (ref 22–44)
MCH RBC QN AUTO: 30.6 PG (ref 27–34.8)
MCHC RBC AUTO-ENTMCNC: 32.7 G/DL (ref 32.4–35.8)
MCV RBC AUTO: 93.3 FL (ref 80–100)
MD: (no result)
MONOCYTES # BLD AUTO: 0.45 X10^3/UL (ref 0.2–0.8)
MONOCYTES NFR BLD AUTO: 2 % (ref 2–9)
NEUTROPHILS # BLD AUTO: 17.39 X10^3/UL (ref 1.8–6.8)
NEUTROPHILS NFR BLD AUTO: 92 % (ref 42–75)
PLATELET # BLD AUTO: 279 X10^3/UL (ref 130–400)
PMV BLD AUTO: 7.1 FL (ref 7.4–10.4)
PROT SERPL-MCNC: 5.9 G/DL (ref 6.4–8.2)
PROTHROMBIN TIME: 10.7 SECONDS (ref 9.6–11.5)
RBC # BLD AUTO: 3.22 X10^6/UL (ref 3.82–5.3)

## 2019-11-18 PROCEDURE — 05HM33Z INSERTION OF INFUSION DEVICE INTO RIGHT INTERNAL JUGULAR VEIN, PERCUTANEOUS APPROACH: ICD-10-PCS | Performed by: HOSPITALIST

## 2019-11-18 PROCEDURE — B543ZZA ULTRASONOGRAPHY OF RIGHT JUGULAR VEINS, GUIDANCE: ICD-10-PCS | Performed by: HOSPITALIST

## 2019-11-18 PROCEDURE — 0SPA0JZ REMOVAL OF SYNTHETIC SUBSTITUTE FROM RIGHT HIP JOINT, ACETABULAR SURFACE, OPEN APPROACH: ICD-10-PCS | Performed by: ORTHOPAEDIC SURGERY

## 2019-11-18 PROCEDURE — 03HY32Z INSERTION OF MONITORING DEVICE INTO UPPER ARTERY, PERCUTANEOUS APPROACH: ICD-10-PCS | Performed by: HOSPITALIST

## 2019-11-18 PROCEDURE — 0SRA0JZ REPLACEMENT OF RIGHT HIP JOINT, ACETABULAR SURFACE WITH SYNTHETIC SUBSTITUTE, OPEN APPROACH: ICD-10-PCS | Performed by: ORTHOPAEDIC SURGERY

## 2019-11-18 RX ADMIN — HYDROCODONE BITARTRATE AND ACETAMINOPHEN PRN ML: 7.5; 325 SOLUTION ORAL at 02:38

## 2019-11-18 RX ADMIN — DOCUSATE SODIUM SCH MG: 50 LIQUID ORAL at 07:28

## 2019-11-18 RX ADMIN — SODIUM CHLORIDE, SODIUM LACTATE, POTASSIUM CHLORIDE, AND CALCIUM CHLORIDE SCH MLS/HR: .6; .31; .03; .02 INJECTION, SOLUTION INTRAVENOUS at 07:27

## 2019-11-18 RX ADMIN — SODIUM CHLORIDE, PRESERVATIVE FREE SCH ML: 5 INJECTION INTRAVENOUS at 07:27

## 2019-12-11 NOTE — PROGRESS NOTES
Chief Complaint   Patient presents with   • Follow-Up   • Palpitations   • Hyperlipidemia       Subjective:   Rosemarie Guajardo is a 83 y.o. female who presents today for three month follow-up of WPW, palpitations and hyperlipidemia.    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 KAUR Moreira. She did see her in December 2018, but is here for early follow-up.    Generally, she is feeling fine. She does have occasional skipped beats, but very rare and nonsustained. No chest pain, pressure or discomfort; no shortness of breath, orthopnea or PND; no dizziness or syncope; no edema. She does have some knee pain, which limits her activity slightly.    Past Medical History:   Diagnosis Date   • History of hypercholesterolemia     • Mitral regurgitation 08/2018    Echocardiogram with normal LV size, mild LVH, LVEF 55%. Mild MR, mild AS (Vmax 2.0m/s, peak 16mmHg, mean 10mmHg), no AI. Mild TR. RVSP 40mmHg.   • Palpitations     • WPW (Yoon-Parkinson-White syndrome) 1970s    Originally diagnosed in [place], [place].     Past Surgical History:   Procedure Laterality Date   • CATARACT EXTRACTION WITH IOL     • CERVICAL DISK AND FUSION ANTERIOR       Family History   Problem Relation Age of Onset   • Heart Disease Mother         atrial fibrillation   • Lung Disease Father         PNA   • Stroke Father         CVA     Social History     Social History   • Marital status:      Spouse name: N/A   • Number of children: N/A   • Years of education: N/A     Occupational History   • Not on file.     Social History Main Topics   • Smoking status: Never Smoker   • Smokeless tobacco: Never Used   • Alcohol use 2.5 oz/week     5 Glasses of wine per week      Comment: weekly   • Drug use: No   • Sexual activity: Not on file     Other Topics Concern   • Not on file     Social History  Narrative   • No narrative on file     Allergies   Allergen Reactions   • Statins [Hmg-Coa-R Inhibitors]      Intolerance   • Codeine Nausea     BP drops   • Doxycycline Vomiting   • Sulfamethoxazole-Trimethoprim      Other reaction(s): Unknown     Outpatient Encounter Prescriptions as of 2/11/2019   Medication Sig Dispense Refill   • Magnesium Oxide (MAGOX 400 PO) Take  by mouth.     • B Complex-C-Folic Acid (STRESS B COMPLEX PO) Take  by mouth.     • PANTOTHENIC ACID PO Take 75 mg by mouth.     • BIOTIN PO Take  by mouth.     • P-Aminobenzoic Acid (PABA PO) Take  by mouth.     • INOSITOL-5 PO Take  by mouth.     • Non Formulary Request elenthero 125 mg     • Non Formulary Request PB8 780mg     • ascorbic acid (ASCORBIC ACID) 500 MG Tab Take 500 mg by mouth every day.     • Multiple Vitamin (MULTI VITAMIN DAILY PO) Take  by mouth.     • LUTEIN PO Take  by mouth.     • fluticasone (FLONASE) 50 MCG/ACT nasal spray Spray 2 Sprays in nose as needed.     • coenzyme Q-10 100 MG Cap capsule Take 1 Cap by mouth every day. 30 Cap 11   • Cholecalciferol (VITAMIN D3) 2000 UNITS Tab Take  by mouth.     • [DISCONTINUED] azithromycin (ZITHROMAX) 250 MG Tab      • [DISCONTINUED] ciprofloxacin (CIPRO) 500 MG Tab      • [DISCONTINUED] cyclobenzaprine (FLEXERIL) 10 MG Tab        No facility-administered encounter medications on file as of 2/11/2019.      Review of Systems   Constitutional: Negative for chills and fever.   HENT: Negative for congestion.    Respiratory: Negative for cough and shortness of breath.    Cardiovascular: Positive for palpitations. Negative for chest pain, orthopnea, leg swelling and PND.        Rare, nonsustained.   Gastrointestinal: Negative for abdominal pain and nausea.   Musculoskeletal: Negative for myalgias.   Skin: Negative for rash.   Neurological: Negative for dizziness, loss of consciousness and headaches.   Endo/Heme/Allergies: Does not bruise/bleed easily.        Objective:   /72 (BP  "Location: Left arm, Patient Position: Sitting)   Pulse 62   Ht 1.753 m (5' 9\")   Wt 70.3 kg (155 lb)   SpO2 94%   BMI 22.89 kg/m²     Physical Exam   Constitutional: She is oriented to person, place, and time. She appears well-developed and well-nourished.   HENT:   Head: Normocephalic.   Eyes: EOM are normal.   Neck: Normal range of motion. Neck supple. No JVD present.   Cardiovascular: Normal rate, regular rhythm and normal heart sounds.    Pulmonary/Chest: Effort normal and breath sounds normal. No respiratory distress. She has no wheezes. She has no rales.   Abdominal: Soft. Bowel sounds are normal. She exhibits no distension. There is no tenderness.   Musculoskeletal: Normal range of motion. She exhibits no edema.   Neurological: She is alert and oriented to person, place, and time.   Skin: Skin is warm and dry. No rash noted.   Psychiatric: She has a normal mood and affect.     LABS AS OF 12/7/2018:  Potassium 4.0  Glucose 100  BUN 18  Creatinine 0.88  AST 17  ALT 22  Cholesterol 222  Triglycerides 87  HDL 66      CONCLUSIONS OF ECHOCARDIOGRAM OF 8/27/2018:  Prior echo on 10/13/15.  Normal left ventricular systolic function.   Calcified aortic valve leaflets. Borderline mild aortic stenosis. Vmax   is 2.0 m/s. Transvalvular gradients are - Peak: 16 mmHg, Mean: 10 mmHg.  Estimated right ventricular systolic pressure  is 40 mmHg.  Unable to open prior images for comparison.    Assessment:     1. WPW (Yoon-Parkinson-White syndrome)     2. Palpitations     3. Mitral valve insufficiency, unspecified etiology     4. History of hypercholesterolemia         Medical Decision Making:  Today's Assessment / Status / Plan:     1. History of WPW, with rare palpitations, stable. She does remain on Magnesium.    2. Mild MR, stable on last echo.    3. Hyperlipidemia, not currently on any therapy. She is not interested in starting treatment.    Same medications for now. Keep FU with Dr. Moreira in December 2019; " FU sooner if clinical condition changes.    Collaborating MD: Amrit   Contraindicated